# Patient Record
Sex: FEMALE | Race: WHITE | URBAN - METROPOLITAN AREA
[De-identification: names, ages, dates, MRNs, and addresses within clinical notes are randomized per-mention and may not be internally consistent; named-entity substitution may affect disease eponyms.]

---

## 2017-01-20 PROBLEM — D49.2 NEOPLASM OF UNSPECIFIED BEHAVIOR OF BONE, SOFT TISSUE, AND SKIN: Status: RESOLVED | Noted: 2017-01-06 | Resolved: 2017-01-20

## 2018-09-12 ENCOUNTER — APPOINTMENT (RX ONLY)
Dept: URBAN - METROPOLITAN AREA CLINIC 20 | Facility: CLINIC | Age: 53
Setting detail: DERMATOLOGY
End: 2018-09-12

## 2018-09-12 DIAGNOSIS — D18.0 HEMANGIOMA: ICD-10-CM

## 2018-09-12 DIAGNOSIS — L81.4 OTHER MELANIN HYPERPIGMENTATION: ICD-10-CM

## 2018-09-12 DIAGNOSIS — L29.89 OTHER PRURITUS: ICD-10-CM

## 2018-09-12 DIAGNOSIS — L91.8 OTHER HYPERTROPHIC DISORDERS OF THE SKIN: ICD-10-CM

## 2018-09-12 DIAGNOSIS — L57.8 OTHER SKIN CHANGES DUE TO CHRONIC EXPOSURE TO NONIONIZING RADIATION: ICD-10-CM

## 2018-09-12 DIAGNOSIS — D22 MELANOCYTIC NEVI: ICD-10-CM

## 2018-09-12 DIAGNOSIS — L82.0 INFLAMED SEBORRHEIC KERATOSIS: ICD-10-CM

## 2018-09-12 DIAGNOSIS — L29.8 OTHER PRURITUS: ICD-10-CM

## 2018-09-12 DIAGNOSIS — L82.1 OTHER SEBORRHEIC KERATOSIS: ICD-10-CM

## 2018-09-12 PROBLEM — E78.5 HYPERLIPIDEMIA, UNSPECIFIED: Status: ACTIVE | Noted: 2018-09-12

## 2018-09-12 PROBLEM — F32.9 MAJOR DEPRESSIVE DISORDER, SINGLE EPISODE, UNSPECIFIED: Status: ACTIVE | Noted: 2018-09-12

## 2018-09-12 PROBLEM — D18.01 HEMANGIOMA OF SKIN AND SUBCUTANEOUS TISSUE: Status: ACTIVE | Noted: 2018-09-12

## 2018-09-12 PROBLEM — D22.5 MELANOCYTIC NEVI OF TRUNK: Status: ACTIVE | Noted: 2018-09-12

## 2018-09-12 PROBLEM — L57.0 ACTINIC KERATOSIS: Status: ACTIVE | Noted: 2018-09-12

## 2018-09-12 PROBLEM — J45.909 UNSPECIFIED ASTHMA, UNCOMPLICATED: Status: ACTIVE | Noted: 2018-09-12

## 2018-09-12 PROCEDURE — 99214 OFFICE O/P EST MOD 30 MIN: CPT | Mod: 25

## 2018-09-12 PROCEDURE — ? LIQUID NITROGEN

## 2018-09-12 PROCEDURE — 17110 DESTRUCTION B9 LES UP TO 14: CPT

## 2018-09-12 PROCEDURE — ? COUNSELING

## 2018-09-12 PROCEDURE — ? PRESCRIPTION

## 2018-09-12 RX ORDER — TRIAMCINOLONE ACETONIDE 1 MG/G
CREAM TOPICAL BID
Qty: 1 | Refills: 2 | Status: ERX | COMMUNITY
Start: 2018-09-12

## 2018-09-12 RX ADMIN — TRIAMCINOLONE ACETONIDE 1: 1 CREAM TOPICAL at 00:00

## 2018-09-12 ASSESSMENT — LOCATION ZONE DERM
LOCATION ZONE: TRUNK
LOCATION ZONE: FACE
LOCATION ZONE: AXILLAE
LOCATION ZONE: LEG
LOCATION ZONE: ARM

## 2018-09-12 ASSESSMENT — LOCATION SIMPLE DESCRIPTION DERM
LOCATION SIMPLE: LEFT UPPER ARM
LOCATION SIMPLE: RIGHT FOREARM
LOCATION SIMPLE: CHEST
LOCATION SIMPLE: RIGHT UPPER BACK
LOCATION SIMPLE: RIGHT THIGH
LOCATION SIMPLE: LEFT AXILLARY VAULT
LOCATION SIMPLE: RIGHT UPPER ARM
LOCATION SIMPLE: RIGHT AXILLARY VAULT
LOCATION SIMPLE: RIGHT CLAVICULAR SKIN
LOCATION SIMPLE: LEFT FOREARM
LOCATION SIMPLE: LEFT THIGH
LOCATION SIMPLE: LEFT CHEEK
LOCATION SIMPLE: RIGHT CHEEK

## 2018-09-12 ASSESSMENT — LOCATION DETAILED DESCRIPTION DERM
LOCATION DETAILED: LEFT AXILLARY VAULT
LOCATION DETAILED: LEFT LATERAL PROXIMAL UPPER ARM
LOCATION DETAILED: RIGHT INFERIOR UPPER BACK
LOCATION DETAILED: RIGHT CENTRAL MALAR CHEEK
LOCATION DETAILED: RIGHT PROXIMAL DORSAL FOREARM
LOCATION DETAILED: RIGHT ANTERIOR DISTAL THIGH
LOCATION DETAILED: RIGHT SUPERIOR MEDIAL UPPER BACK
LOCATION DETAILED: LEFT ANTERIOR PROXIMAL UPPER ARM
LOCATION DETAILED: RIGHT SUPERIOR CENTRAL MALAR CHEEK
LOCATION DETAILED: RIGHT AXILLARY VAULT
LOCATION DETAILED: LEFT PROXIMAL DORSAL FOREARM
LOCATION DETAILED: RIGHT CLAVICULAR SKIN
LOCATION DETAILED: RIGHT MID-UPPER BACK
LOCATION DETAILED: LEFT ANTERIOR DISTAL THIGH
LOCATION DETAILED: LEFT INFERIOR CENTRAL MALAR CHEEK
LOCATION DETAILED: RIGHT ANTERIOR PROXIMAL UPPER ARM
LOCATION DETAILED: LEFT MEDIAL SUPERIOR CHEST

## 2018-09-12 NOTE — PROCEDURE: LIQUID NITROGEN
[facility] Call Center    Phone Message    May a detailed message be left on voicemail: yes    Reason for Call: Other:     Action Taken: Other: Not routed - encounter not needed - closing it.    
Consent: The patient's consent was obtained including but not limited to risks of crusting, scabbing, blistering, scarring, darker or lighter pigmentary change, recurrence, incomplete removal and infection.
Post-Care Instructions: I reviewed with the patient in detail post-care instructions. Patient is to wear sunprotection, and avoid picking at any of the treated lesions. Pt may apply Vaseline to crusted or scabbing areas.
Include Z78.9 (Other Specified Conditions Influencing Health Status) As An Associated Diagnosis?: No
Medical Necessity Clause: This procedure was medically necessary because the lesions that were treated were:
Detail Level: Detailed
Medical Necessity Information: It is in your best interest to select a reason for this procedure from the list below. All of these items fulfill various CMS LCD requirements except the new and changing color options.
Duration Of Freeze Thaw-Cycle (Seconds): 0

## 2019-07-25 ENCOUNTER — APPOINTMENT (RX ONLY)
Dept: URBAN - METROPOLITAN AREA CLINIC 4 | Facility: CLINIC | Age: 54
Setting detail: DERMATOLOGY
End: 2019-07-25

## 2019-07-25 DIAGNOSIS — L81.4 OTHER MELANIN HYPERPIGMENTATION: ICD-10-CM

## 2019-07-25 DIAGNOSIS — L57.8 OTHER SKIN CHANGES DUE TO CHRONIC EXPOSURE TO NONIONIZING RADIATION: ICD-10-CM

## 2019-07-25 DIAGNOSIS — D22 MELANOCYTIC NEVI: ICD-10-CM

## 2019-07-25 DIAGNOSIS — D18.0 HEMANGIOMA: ICD-10-CM

## 2019-07-25 DIAGNOSIS — L82.1 OTHER SEBORRHEIC KERATOSIS: ICD-10-CM

## 2019-07-25 DIAGNOSIS — I78.1 NEVUS, NON-NEOPLASTIC: ICD-10-CM

## 2019-07-25 DIAGNOSIS — Z71.89 OTHER SPECIFIED COUNSELING: ICD-10-CM

## 2019-07-25 PROBLEM — D18.01 HEMANGIOMA OF SKIN AND SUBCUTANEOUS TISSUE: Status: ACTIVE | Noted: 2019-07-25

## 2019-07-25 PROBLEM — D22.5 MELANOCYTIC NEVI OF TRUNK: Status: ACTIVE | Noted: 2019-07-25

## 2019-07-25 PROCEDURE — 99214 OFFICE O/P EST MOD 30 MIN: CPT

## 2019-07-25 PROCEDURE — ? COUNSELING

## 2019-07-25 PROCEDURE — ? ADDITIONAL NOTES

## 2019-07-25 ASSESSMENT — LOCATION ZONE DERM
LOCATION ZONE: ARM
LOCATION ZONE: TRUNK
LOCATION ZONE: FACE

## 2019-07-25 ASSESSMENT — LOCATION DETAILED DESCRIPTION DERM
LOCATION DETAILED: LEFT DISTAL DORSAL FOREARM
LOCATION DETAILED: RIGHT CENTRAL MALAR CHEEK
LOCATION DETAILED: UPPER STERNUM
LOCATION DETAILED: RIGHT SUPERIOR MEDIAL MIDBACK
LOCATION DETAILED: LEFT PROXIMAL DORSAL FOREARM
LOCATION DETAILED: RIGHT INFERIOR CENTRAL MALAR CHEEK
LOCATION DETAILED: RIGHT PROXIMAL DORSAL FOREARM
LOCATION DETAILED: STERNAL NOTCH
LOCATION DETAILED: INFERIOR THORACIC SPINE
LOCATION DETAILED: LEFT INFERIOR CENTRAL MALAR CHEEK
LOCATION DETAILED: LEFT INFERIOR MEDIAL MIDBACK
LOCATION DETAILED: RIGHT DISTAL DORSAL FOREARM

## 2019-07-25 ASSESSMENT — LOCATION SIMPLE DESCRIPTION DERM
LOCATION SIMPLE: RIGHT LOWER BACK
LOCATION SIMPLE: UPPER BACK
LOCATION SIMPLE: CHEST
LOCATION SIMPLE: RIGHT FOREARM
LOCATION SIMPLE: LEFT LOWER BACK
LOCATION SIMPLE: RIGHT CHEEK
LOCATION SIMPLE: LEFT FOREARM
LOCATION SIMPLE: LEFT CHEEK

## 2019-07-25 NOTE — PROCEDURE: ADDITIONAL NOTES
Detail Level: Simple
Additional Notes: Reassure and observe for any changes. \\nIncludes spots of concern on intake.

## 2020-03-05 ENCOUNTER — HOSPITAL ENCOUNTER (OUTPATIENT)
Dept: LAB | Facility: MEDICAL CENTER | Age: 55
End: 2020-03-05
Attending: NURSE PRACTITIONER
Payer: COMMERCIAL

## 2020-03-05 LAB
ALBUMIN SERPL BCP-MCNC: 4.3 G/DL (ref 3.2–4.9)
ALBUMIN/GLOB SERPL: 1.7 G/DL
ALP SERPL-CCNC: 92 U/L (ref 30–99)
ALT SERPL-CCNC: 43 U/L (ref 2–50)
ANION GAP SERPL CALC-SCNC: 10 MMOL/L (ref 0–11.9)
AST SERPL-CCNC: 31 U/L (ref 12–45)
BASOPHILS # BLD AUTO: 0.7 % (ref 0–1.8)
BASOPHILS # BLD: 0.05 K/UL (ref 0–0.12)
BILIRUB SERPL-MCNC: 0.7 MG/DL (ref 0.1–1.5)
BUN SERPL-MCNC: 22 MG/DL (ref 8–22)
CALCIUM SERPL-MCNC: 9.1 MG/DL (ref 8.5–10.5)
CHLORIDE SERPL-SCNC: 106 MMOL/L (ref 96–112)
CHOLEST SERPL-MCNC: 214 MG/DL (ref 100–199)
CO2 SERPL-SCNC: 24 MMOL/L (ref 20–33)
CREAT SERPL-MCNC: 0.73 MG/DL (ref 0.5–1.4)
EOSINOPHIL # BLD AUTO: 0.36 K/UL (ref 0–0.51)
EOSINOPHIL NFR BLD: 4.7 % (ref 0–6.9)
ERYTHROCYTE [DISTWIDTH] IN BLOOD BY AUTOMATED COUNT: 47.4 FL (ref 35.9–50)
EST. AVERAGE GLUCOSE BLD GHB EST-MCNC: 108 MG/DL
FASTING STATUS PATIENT QL REPORTED: NORMAL
GLOBULIN SER CALC-MCNC: 2.6 G/DL (ref 1.9–3.5)
GLUCOSE SERPL-MCNC: 88 MG/DL (ref 65–99)
HBA1C MFR BLD: 5.4 % (ref 0–5.6)
HCT VFR BLD AUTO: 35.5 % (ref 37–47)
HDLC SERPL-MCNC: 103 MG/DL
HGB BLD-MCNC: 11.7 G/DL (ref 12–16)
IMM GRANULOCYTES # BLD AUTO: 0.04 K/UL (ref 0–0.11)
IMM GRANULOCYTES NFR BLD AUTO: 0.5 % (ref 0–0.9)
LDLC SERPL CALC-MCNC: 94 MG/DL
LYMPHOCYTES # BLD AUTO: 1.38 K/UL (ref 1–4.8)
LYMPHOCYTES NFR BLD: 18.2 % (ref 22–41)
MCH RBC QN AUTO: 32.1 PG (ref 27–33)
MCHC RBC AUTO-ENTMCNC: 33 G/DL (ref 33.6–35)
MCV RBC AUTO: 97.5 FL (ref 81.4–97.8)
MONOCYTES # BLD AUTO: 0.64 K/UL (ref 0–0.85)
MONOCYTES NFR BLD AUTO: 8.4 % (ref 0–13.4)
NEUTROPHILS # BLD AUTO: 5.12 K/UL (ref 2–7.15)
NEUTROPHILS NFR BLD: 67.5 % (ref 44–72)
NRBC # BLD AUTO: 0 K/UL
NRBC BLD-RTO: 0 /100 WBC
PLATELET # BLD AUTO: 339 K/UL (ref 164–446)
PMV BLD AUTO: 9.7 FL (ref 9–12.9)
POTASSIUM SERPL-SCNC: 3.9 MMOL/L (ref 3.6–5.5)
PROT SERPL-MCNC: 6.9 G/DL (ref 6–8.2)
RBC # BLD AUTO: 3.64 M/UL (ref 4.2–5.4)
SODIUM SERPL-SCNC: 140 MMOL/L (ref 135–145)
TRIGL SERPL-MCNC: 84 MG/DL (ref 0–149)
WBC # BLD AUTO: 7.6 K/UL (ref 4.8–10.8)

## 2020-03-05 PROCEDURE — 85025 COMPLETE CBC W/AUTO DIFF WBC: CPT

## 2020-03-05 PROCEDURE — 83036 HEMOGLOBIN GLYCOSYLATED A1C: CPT

## 2020-03-05 PROCEDURE — 80053 COMPREHEN METABOLIC PANEL: CPT

## 2020-03-05 PROCEDURE — 36415 COLL VENOUS BLD VENIPUNCTURE: CPT

## 2020-03-05 PROCEDURE — 80061 LIPID PANEL: CPT

## 2020-03-12 ENCOUNTER — HOSPITAL ENCOUNTER (OUTPATIENT)
Facility: MEDICAL CENTER | Age: 55
End: 2020-03-12
Attending: NURSE PRACTITIONER
Payer: COMMERCIAL

## 2020-03-12 LAB — HEMOCCULT STL QL: NEGATIVE

## 2020-03-12 PROCEDURE — 82272 OCCULT BLD FECES 1-3 TESTS: CPT

## 2020-04-10 ENCOUNTER — HOSPITAL ENCOUNTER (OUTPATIENT)
Dept: LAB | Facility: MEDICAL CENTER | Age: 55
End: 2020-04-10
Attending: NURSE PRACTITIONER
Payer: COMMERCIAL

## 2020-04-10 LAB
BASOPHILS # BLD AUTO: 0.9 % (ref 0–1.8)
BASOPHILS # BLD: 0.07 K/UL (ref 0–0.12)
EOSINOPHIL # BLD AUTO: 0.35 K/UL (ref 0–0.51)
EOSINOPHIL NFR BLD: 4.7 % (ref 0–6.9)
ERYTHROCYTE [DISTWIDTH] IN BLOOD BY AUTOMATED COUNT: 44.7 FL (ref 35.9–50)
FERRITIN SERPL-MCNC: 119 NG/ML (ref 10–291)
HCT VFR BLD AUTO: 38.2 % (ref 37–47)
HGB BLD-MCNC: 12.8 G/DL (ref 12–16)
IMM GRANULOCYTES # BLD AUTO: 0.02 K/UL (ref 0–0.11)
IMM GRANULOCYTES NFR BLD AUTO: 0.3 % (ref 0–0.9)
IRON SATN MFR SERPL: 25 % (ref 15–55)
IRON SERPL-MCNC: 82 UG/DL (ref 40–170)
LYMPHOCYTES # BLD AUTO: 1.3 K/UL (ref 1–4.8)
LYMPHOCYTES NFR BLD: 17.6 % (ref 22–41)
MCH RBC QN AUTO: 31.8 PG (ref 27–33)
MCHC RBC AUTO-ENTMCNC: 33.5 G/DL (ref 33.6–35)
MCV RBC AUTO: 94.8 FL (ref 81.4–97.8)
MONOCYTES # BLD AUTO: 0.56 K/UL (ref 0–0.85)
MONOCYTES NFR BLD AUTO: 7.6 % (ref 0–13.4)
NEUTROPHILS # BLD AUTO: 5.08 K/UL (ref 2–7.15)
NEUTROPHILS NFR BLD: 68.9 % (ref 44–72)
NRBC # BLD AUTO: 0 K/UL
NRBC BLD-RTO: 0 /100 WBC
PLATELET # BLD AUTO: 324 K/UL (ref 164–446)
PMV BLD AUTO: 9.9 FL (ref 9–12.9)
RBC # BLD AUTO: 4.03 M/UL (ref 4.2–5.4)
TIBC SERPL-MCNC: 322 UG/DL (ref 250–450)
UIBC SERPL-MCNC: 240 UG/DL (ref 110–370)
WBC # BLD AUTO: 7.4 K/UL (ref 4.8–10.8)

## 2020-04-10 PROCEDURE — 36415 COLL VENOUS BLD VENIPUNCTURE: CPT

## 2020-04-10 PROCEDURE — 82728 ASSAY OF FERRITIN: CPT

## 2020-04-10 PROCEDURE — 83540 ASSAY OF IRON: CPT

## 2020-04-10 PROCEDURE — 85025 COMPLETE CBC W/AUTO DIFF WBC: CPT

## 2020-04-10 PROCEDURE — 83550 IRON BINDING TEST: CPT

## 2020-06-17 ENCOUNTER — APPOINTMENT (RX ONLY)
Dept: URBAN - METROPOLITAN AREA CLINIC 4 | Facility: CLINIC | Age: 55
Setting detail: DERMATOLOGY
End: 2020-06-17

## 2020-06-17 DIAGNOSIS — L82.1 OTHER SEBORRHEIC KERATOSIS: ICD-10-CM

## 2020-06-17 DIAGNOSIS — B07.0 PLANTAR WART: ICD-10-CM

## 2020-06-17 DIAGNOSIS — L81.4 OTHER MELANIN HYPERPIGMENTATION: ICD-10-CM

## 2020-06-17 DIAGNOSIS — D22 MELANOCYTIC NEVI: ICD-10-CM

## 2020-06-17 DIAGNOSIS — D18.0 HEMANGIOMA: ICD-10-CM

## 2020-06-17 DIAGNOSIS — Z71.89 OTHER SPECIFIED COUNSELING: ICD-10-CM

## 2020-06-17 DIAGNOSIS — L57.8 OTHER SKIN CHANGES DUE TO CHRONIC EXPOSURE TO NONIONIZING RADIATION: ICD-10-CM

## 2020-06-17 PROBLEM — D18.01 HEMANGIOMA OF SKIN AND SUBCUTANEOUS TISSUE: Status: ACTIVE | Noted: 2020-06-17

## 2020-06-17 PROBLEM — D48.5 NEOPLASM OF UNCERTAIN BEHAVIOR OF SKIN: Status: ACTIVE | Noted: 2020-06-17

## 2020-06-17 PROBLEM — D22.5 MELANOCYTIC NEVI OF TRUNK: Status: ACTIVE | Noted: 2020-06-17

## 2020-06-17 PROCEDURE — ? LIQUID NITROGEN

## 2020-06-17 PROCEDURE — ? COUNSELING

## 2020-06-17 PROCEDURE — ? ADDITIONAL NOTES

## 2020-06-17 PROCEDURE — 99213 OFFICE O/P EST LOW 20 MIN: CPT | Mod: 25

## 2020-06-17 PROCEDURE — 17110 DESTRUCTION B9 LES UP TO 14: CPT

## 2020-06-17 ASSESSMENT — LOCATION DETAILED DESCRIPTION DERM
LOCATION DETAILED: RIGHT DISTAL DORSAL FOREARM
LOCATION DETAILED: LEFT DISTAL DORSAL FOREARM
LOCATION DETAILED: RIGHT INFERIOR CENTRAL MALAR CHEEK
LOCATION DETAILED: RIGHT SUPERIOR MEDIAL MIDBACK
LOCATION DETAILED: LEFT INFERIOR CENTRAL MALAR CHEEK
LOCATION DETAILED: LEFT PROXIMAL DORSAL FOREARM
LOCATION DETAILED: LEFT INFERIOR MEDIAL MIDBACK
LOCATION DETAILED: RIGHT PROXIMAL DORSAL FOREARM
LOCATION DETAILED: UPPER STERNUM
LOCATION DETAILED: INFERIOR THORACIC SPINE
LOCATION DETAILED: LEFT INSTEP
LOCATION DETAILED: STERNAL NOTCH

## 2020-06-17 ASSESSMENT — LOCATION ZONE DERM
LOCATION ZONE: FEET
LOCATION ZONE: FACE
LOCATION ZONE: ARM
LOCATION ZONE: TRUNK

## 2020-06-17 ASSESSMENT — LOCATION SIMPLE DESCRIPTION DERM
LOCATION SIMPLE: LEFT LOWER BACK
LOCATION SIMPLE: RIGHT CHEEK
LOCATION SIMPLE: RIGHT FOREARM
LOCATION SIMPLE: CHEST
LOCATION SIMPLE: LEFT CHEEK
LOCATION SIMPLE: UPPER BACK
LOCATION SIMPLE: LEFT PLANTAR SURFACE
LOCATION SIMPLE: LEFT FOREARM
LOCATION SIMPLE: RIGHT LOWER BACK

## 2020-06-18 ENCOUNTER — HOSPITAL ENCOUNTER (OUTPATIENT)
Dept: LAB | Facility: MEDICAL CENTER | Age: 55
End: 2020-06-18
Attending: NURSE PRACTITIONER
Payer: COMMERCIAL

## 2020-06-18 LAB
BASOPHILS # BLD AUTO: 1 % (ref 0–1.8)
BASOPHILS # BLD: 0.07 K/UL (ref 0–0.12)
EOSINOPHIL # BLD AUTO: 0.5 K/UL (ref 0–0.51)
EOSINOPHIL NFR BLD: 7.3 % (ref 0–6.9)
ERYTHROCYTE [DISTWIDTH] IN BLOOD BY AUTOMATED COUNT: 47.7 FL (ref 35.9–50)
HCT VFR BLD AUTO: 39.8 % (ref 37–47)
HGB BLD-MCNC: 13 G/DL (ref 12–16)
IMM GRANULOCYTES # BLD AUTO: 0.02 K/UL (ref 0–0.11)
IMM GRANULOCYTES NFR BLD AUTO: 0.3 % (ref 0–0.9)
LYMPHOCYTES # BLD AUTO: 1.27 K/UL (ref 1–4.8)
LYMPHOCYTES NFR BLD: 18.5 % (ref 22–41)
MCH RBC QN AUTO: 31.9 PG (ref 27–33)
MCHC RBC AUTO-ENTMCNC: 32.7 G/DL (ref 33.6–35)
MCV RBC AUTO: 97.8 FL (ref 81.4–97.8)
MONOCYTES # BLD AUTO: 0.59 K/UL (ref 0–0.85)
MONOCYTES NFR BLD AUTO: 8.6 % (ref 0–13.4)
NEUTROPHILS # BLD AUTO: 4.4 K/UL (ref 2–7.15)
NEUTROPHILS NFR BLD: 64.3 % (ref 44–72)
NRBC # BLD AUTO: 0 K/UL
NRBC BLD-RTO: 0 /100 WBC
PLATELET # BLD AUTO: 309 K/UL (ref 164–446)
PMV BLD AUTO: 10.1 FL (ref 9–12.9)
RBC # BLD AUTO: 4.07 M/UL (ref 4.2–5.4)
WBC # BLD AUTO: 6.9 K/UL (ref 4.8–10.8)

## 2020-06-18 PROCEDURE — 80053 COMPREHEN METABOLIC PANEL: CPT

## 2020-06-18 PROCEDURE — 83550 IRON BINDING TEST: CPT

## 2020-06-18 PROCEDURE — 36415 COLL VENOUS BLD VENIPUNCTURE: CPT

## 2020-06-18 PROCEDURE — 83036 HEMOGLOBIN GLYCOSYLATED A1C: CPT

## 2020-06-18 PROCEDURE — 80061 LIPID PANEL: CPT

## 2020-06-18 PROCEDURE — 85025 COMPLETE CBC W/AUTO DIFF WBC: CPT

## 2020-06-18 PROCEDURE — 83540 ASSAY OF IRON: CPT

## 2020-06-19 LAB
ALBUMIN SERPL BCP-MCNC: 4.8 G/DL (ref 3.2–4.9)
ALBUMIN/GLOB SERPL: 1.9 G/DL
ALP SERPL-CCNC: 104 U/L (ref 30–99)
ALT SERPL-CCNC: 50 U/L (ref 2–50)
ANION GAP SERPL CALC-SCNC: 15 MMOL/L (ref 7–16)
AST SERPL-CCNC: 37 U/L (ref 12–45)
BILIRUB SERPL-MCNC: 0.5 MG/DL (ref 0.1–1.5)
BUN SERPL-MCNC: 15 MG/DL (ref 8–22)
CALCIUM SERPL-MCNC: 9.6 MG/DL (ref 8.5–10.5)
CHLORIDE SERPL-SCNC: 99 MMOL/L (ref 96–112)
CHOLEST SERPL-MCNC: 316 MG/DL (ref 100–199)
CO2 SERPL-SCNC: 24 MMOL/L (ref 20–33)
CREAT SERPL-MCNC: 0.75 MG/DL (ref 0.5–1.4)
EST. AVERAGE GLUCOSE BLD GHB EST-MCNC: 105 MG/DL
FASTING STATUS PATIENT QL REPORTED: NORMAL
GLOBULIN SER CALC-MCNC: 2.5 G/DL (ref 1.9–3.5)
GLUCOSE SERPL-MCNC: 81 MG/DL (ref 65–99)
HBA1C MFR BLD: 5.3 % (ref 0–5.6)
HDLC SERPL-MCNC: 115 MG/DL
IRON SATN MFR SERPL: 28 % (ref 15–55)
IRON SERPL-MCNC: 93 UG/DL (ref 40–170)
LDLC SERPL CALC-MCNC: 172 MG/DL
POTASSIUM SERPL-SCNC: 4.6 MMOL/L (ref 3.6–5.5)
PROT SERPL-MCNC: 7.3 G/DL (ref 6–8.2)
SODIUM SERPL-SCNC: 138 MMOL/L (ref 135–145)
TIBC SERPL-MCNC: 327 UG/DL (ref 250–450)
TRIGL SERPL-MCNC: 145 MG/DL (ref 0–149)
UIBC SERPL-MCNC: 234 UG/DL (ref 110–370)

## 2020-06-22 ENCOUNTER — HOSPITAL ENCOUNTER (EMERGENCY)
Facility: MEDICAL CENTER | Age: 55
End: 2020-06-22
Attending: EMERGENCY MEDICINE
Payer: COMMERCIAL

## 2020-06-22 ENCOUNTER — APPOINTMENT (OUTPATIENT)
Dept: RADIOLOGY | Facility: MEDICAL CENTER | Age: 55
End: 2020-06-22
Attending: EMERGENCY MEDICINE
Payer: COMMERCIAL

## 2020-06-22 VITALS
WEIGHT: 149.91 LBS | HEIGHT: 63 IN | HEART RATE: 87 BPM | TEMPERATURE: 98.6 F | SYSTOLIC BLOOD PRESSURE: 132 MMHG | DIASTOLIC BLOOD PRESSURE: 61 MMHG | OXYGEN SATURATION: 96 % | BODY MASS INDEX: 26.56 KG/M2 | RESPIRATION RATE: 18 BRPM

## 2020-06-22 DIAGNOSIS — K52.9 COLITIS: ICD-10-CM

## 2020-06-22 LAB
ABO + RH BLD: NORMAL
ABO GROUP BLD: NORMAL
ALBUMIN SERPL BCP-MCNC: 4.3 G/DL (ref 3.2–4.9)
ALBUMIN/GLOB SERPL: 1.6 G/DL
ALP SERPL-CCNC: 104 U/L (ref 30–99)
ALT SERPL-CCNC: 44 U/L (ref 2–50)
ANION GAP SERPL CALC-SCNC: 14 MMOL/L (ref 7–16)
APTT PPP: 25.8 SEC (ref 24.7–36)
AST SERPL-CCNC: 33 U/L (ref 12–45)
BASOPHILS # BLD AUTO: 0.3 % (ref 0–1.8)
BASOPHILS # BLD: 0.05 K/UL (ref 0–0.12)
BILIRUB SERPL-MCNC: 1 MG/DL (ref 0.1–1.5)
BLD GP AB SCN SERPL QL: NORMAL
BUN SERPL-MCNC: 10 MG/DL (ref 8–22)
CALCIUM SERPL-MCNC: 9.1 MG/DL (ref 8.4–10.2)
CHLORIDE SERPL-SCNC: 95 MMOL/L (ref 96–112)
CO2 SERPL-SCNC: 24 MMOL/L (ref 20–33)
CREAT SERPL-MCNC: 0.73 MG/DL (ref 0.5–1.4)
EOSINOPHIL # BLD AUTO: 0.19 K/UL (ref 0–0.51)
EOSINOPHIL NFR BLD: 1.2 % (ref 0–6.9)
ERYTHROCYTE [DISTWIDTH] IN BLOOD BY AUTOMATED COUNT: 45.3 FL (ref 35.9–50)
GLOBULIN SER CALC-MCNC: 2.7 G/DL (ref 1.9–3.5)
GLUCOSE SERPL-MCNC: 108 MG/DL (ref 65–99)
HCT VFR BLD AUTO: 36.3 % (ref 37–47)
HGB BLD-MCNC: 12.2 G/DL (ref 12–16)
IMM GRANULOCYTES # BLD AUTO: 0.07 K/UL (ref 0–0.11)
IMM GRANULOCYTES NFR BLD AUTO: 0.5 % (ref 0–0.9)
INR PPP: 0.91 (ref 0.87–1.13)
LIPASE SERPL-CCNC: 29 U/L (ref 7–58)
LYMPHOCYTES # BLD AUTO: 1.56 K/UL (ref 1–4.8)
LYMPHOCYTES NFR BLD: 10.2 % (ref 22–41)
MCH RBC QN AUTO: 31.4 PG (ref 27–33)
MCHC RBC AUTO-ENTMCNC: 33.6 G/DL (ref 33.6–35)
MCV RBC AUTO: 93.3 FL (ref 81.4–97.8)
MONOCYTES # BLD AUTO: 0.81 K/UL (ref 0–0.85)
MONOCYTES NFR BLD AUTO: 5.3 % (ref 0–13.4)
NEUTROPHILS # BLD AUTO: 12.68 K/UL (ref 2–7.15)
NEUTROPHILS NFR BLD: 82.5 % (ref 44–72)
NRBC # BLD AUTO: 0 K/UL
NRBC BLD-RTO: 0 /100 WBC
PLATELET # BLD AUTO: 262 K/UL (ref 164–446)
PMV BLD AUTO: 9.2 FL (ref 9–12.9)
POTASSIUM SERPL-SCNC: 3.7 MMOL/L (ref 3.6–5.5)
PROT SERPL-MCNC: 7 G/DL (ref 6–8.2)
PROTHROMBIN TIME: 12.3 SEC (ref 12–14.6)
RBC # BLD AUTO: 3.89 M/UL (ref 4.2–5.4)
RH BLD: NORMAL
SODIUM SERPL-SCNC: 133 MMOL/L (ref 135–145)
WBC # BLD AUTO: 15.4 K/UL (ref 4.8–10.8)

## 2020-06-22 PROCEDURE — 71045 X-RAY EXAM CHEST 1 VIEW: CPT

## 2020-06-22 PROCEDURE — 85730 THROMBOPLASTIN TIME PARTIAL: CPT

## 2020-06-22 PROCEDURE — 700102 HCHG RX REV CODE 250 W/ 637 OVERRIDE(OP): Performed by: EMERGENCY MEDICINE

## 2020-06-22 PROCEDURE — 86900 BLOOD TYPING SEROLOGIC ABO: CPT

## 2020-06-22 PROCEDURE — 83690 ASSAY OF LIPASE: CPT

## 2020-06-22 PROCEDURE — 85025 COMPLETE CBC W/AUTO DIFF WBC: CPT

## 2020-06-22 PROCEDURE — A9270 NON-COVERED ITEM OR SERVICE: HCPCS | Performed by: EMERGENCY MEDICINE

## 2020-06-22 PROCEDURE — 86850 RBC ANTIBODY SCREEN: CPT

## 2020-06-22 PROCEDURE — 80053 COMPREHEN METABOLIC PANEL: CPT

## 2020-06-22 PROCEDURE — 85610 PROTHROMBIN TIME: CPT

## 2020-06-22 PROCEDURE — 99284 EMERGENCY DEPT VISIT MOD MDM: CPT

## 2020-06-22 PROCEDURE — 36415 COLL VENOUS BLD VENIPUNCTURE: CPT

## 2020-06-22 PROCEDURE — 86901 BLOOD TYPING SEROLOGIC RH(D): CPT

## 2020-06-22 RX ORDER — METRONIDAZOLE 500 MG/1
500 TABLET ORAL ONCE
Status: COMPLETED | OUTPATIENT
Start: 2020-06-22 | End: 2020-06-22

## 2020-06-22 RX ORDER — CIPROFLOXACIN 500 MG/1
500 TABLET, FILM COATED ORAL ONCE
Status: COMPLETED | OUTPATIENT
Start: 2020-06-22 | End: 2020-06-22

## 2020-06-22 RX ORDER — METRONIDAZOLE 500 MG/1
500 TABLET ORAL 3 TIMES DAILY
Qty: 21 TAB | Refills: 0 | Status: SHIPPED | OUTPATIENT
Start: 2020-06-22 | End: 2020-06-29

## 2020-06-22 RX ORDER — CIPROFLOXACIN 500 MG/1
500 TABLET, FILM COATED ORAL 2 TIMES DAILY
Qty: 14 TAB | Refills: 0 | Status: SHIPPED | OUTPATIENT
Start: 2020-06-22 | End: 2020-06-29

## 2020-06-22 RX ADMIN — CIPROFLOXACIN HYDROCHLORIDE 500 MG: 500 TABLET, FILM COATED ORAL at 18:54

## 2020-06-22 RX ADMIN — METRONIDAZOLE 500 MG: 500 TABLET ORAL at 18:54

## 2020-06-22 SDOH — HEALTH STABILITY: MENTAL HEALTH: HOW OFTEN DO YOU HAVE A DRINK CONTAINING ALCOHOL?: 2-3 TIMES A WEEK

## 2020-06-22 ASSESSMENT — FIBROSIS 4 INDEX: FIB4 SCORE: 0.95

## 2020-06-22 ASSESSMENT — LIFESTYLE VARIABLES: DO YOU DRINK ALCOHOL: NO

## 2020-06-23 NOTE — ED TRIAGE NOTES
"Merari Paulson 54 y.o. female   Chief Complaint   Patient presents with   • Sent by MD     Had CT scan today that showed \"colitis\" and told to come to the ER for treatment   • Abdominal Pain     Abdominal pain and bright red blood in diarrhea since Sunday   • Bloody Stools     /71   Pulse 98   Temp 37 °C (98.6 °F) (Temporal)   Resp 18   Ht 1.6 m (5' 3\")   Wt 68 kg (149 lb 14.6 oz)   SpO2 95%   BMI 26.56 kg/m²     Pt returned to lob and educated on triage process. Advised to notify RN with changes or concerns.   Negative COVID screen.  "

## 2020-06-23 NOTE — ED NOTES
Pt back to room    PIV obtained, labs and urine sample taken to lab    Pt resting on gurney, pt in no acute distress, pt provided call light, instructed to call if needing any assistance, instructed not to get up by self, brandon in lowest position.

## 2020-06-23 NOTE — ED PROVIDER NOTES
"ED Provider Note    ED Provider Note    Primary care provider: NATASHA Nunez  Means of arrival: Walk-in  History obtained from: Patient    CHIEF COMPLAINT  Chief Complaint   Patient presents with   • Sent by MD     Had CT scan today that showed \"colitis\" and told to come to the ER for treatment   • Abdominal Pain     Abdominal pain and bright red blood in diarrhea since Sunday   • Bloody Stools     Seen at 5:56 PM.   HPI  Merari Paulson is a 54 y.o. female who presents to the Emergency Department for abdominal pain.  The patient notes on Saturday (48-72 hours ago) she began having bright red watery non-formed bowel movements.  This diarrhea persisted for the next 2 days but gradually improved.  She did not have any diarrhea today (Monday).  She had waxing and waning severe cramping left-sided abdominal pain with stabbing symptoms.  These were without any modifying factors.  She feels the pain today is improved compared to how it was yesterday evening.  She went to the [facility] and they ordered a CT.  The CT apparently shows colitis and she was sent to this emergency department for evaluation.    She has had a colonoscopy in the past, she denies any prior history of diverticulitis.  She denies any recent fevers, chills, nausea, vomiting, chest pain, shortness of breath, dysuria, rash or impaired immunity.  She does not take any medications, no history of bleeding diathesis.  She denies feeling lightheaded.  She notes some mild decrease in appetite.    REVIEW OF SYSTEMS  See HPI,   Remainder of ROS negative.     PAST MEDICAL HISTORY   Denies    SURGICAL HISTORY  patient denies any surgical history    SOCIAL HISTORY  Social History     Tobacco Use   • Smoking status: Former Smoker   • Smokeless tobacco: Never Used   Substance Use Topics   • Alcohol use: Yes     Frequency: 2-3 times a week   • Drug use: Never      Social History     Substance and Sexual Activity   Drug Use Never       FAMILY " "HISTORY  History reviewed. No pertinent family history.    CURRENT MEDICATIONS  Reviewed.  See Encounter Summary.     ALLERGIES  Allergies   Allergen Reactions   • Bactrim [Sulfamethoxazole W-Trimethoprim] Hives   • Pcn [Penicillins] Unspecified     \"I lose my coordination and act like I'm drunk\"       PHYSICAL EXAM  VITAL SIGNS: /61   Pulse 87   Temp 37 °C (98.6 °F) (Temporal)   Resp 18   Ht 1.6 m (5' 3\")   Wt 68 kg (149 lb 14.6 oz)   SpO2 96%   BMI 26.56 kg/m²   Constitutional: Awake, alert in no apparent distress.  HENT: Normocephalic, Bilateral external ears normal. Nose normal.   Eyes: Conjunctiva normal, non-icteric, EOMI.    Thorax & Lungs: Easy unlabored respirations, Clear to ascultation bilaterally.  Cardiovascular: Regular rate, Regular rhythm, No murmurs, rubs or gallops.  Abdomen:  Soft, mild left-sided abdominal tenderness without rebound or guarding, nondistended, normal active bowel sounds.   :    Skin: Visualized skin is  Dry, No erythema, No rash.   Musculoskeletal:   No cyanosis, clubbing or edema.  Neurologic: Alert, Grossly non-focal.   Psychiatric: Normal affect, Normal mood  Lymphatic:  No cervical LAD        RADIOLOGY  DX-CHEST-PORTABLE (1 VIEW)   Final Result      1.  No acute cardiopulmonary disease.   2.  Small convex density at the medial LEFT lung base, nodule versus hiatal hernia.  Follow-up recommended.            COURSE & MEDICAL DECISION MAKING  Pertinent Labs & Imaging studies reviewed. (See chart for details)    Differential diagnoses include but are not limited to: Hemorrhagic colitis most likely    5:56 PM - Medical record reviewed, patient seen and examined at bedside.    For the medical coders: The coags, type and screen were ordered at triage.  I personally did not order these lab tests.    6:37 PM -I called the radiology department, CT shows a hiatal hernia hiatal hernia. 30mm L kidney cyst (no need for repeat radiological studies according to radiologist). " Aorta NL. Circumferential mural thickening of the descending colon, this can be secondary to ischemia, infection or idiopathic.      Decision Making:  This is a 54 y.o. year old female who presents with left-sided abdominal pain that is gradually improving along with associated hemorrhagic diarrhea.  The diarrhea itself is resolving as well.  CT showed some left-sided colonic thickening.  The patient does have a leukocytosis.  I feel this is most likely infectious colitis.  Her pain is minimal today despite 48 to 72 hours of symptoms.  I do not feel that ischemic colitis is likely as she should appear much more ill at this point.  She also has no risk factors for this, (no history of atrial fibrillation, no hypotension).    I will treat the patient with Cipro and Flagyl.  The choice for ciprofloxacin is because the patient has a documented allergy to penicillins.  She should return for any severe abdominal pain or any other concern.    Discharge Medications:  Discharge Medication List as of 6/22/2020  6:56 PM      START taking these medications    Details   ciprofloxacin (CIPRO) 500 MG Tab Take 1 Tab by mouth 2 times a day for 7 days., Disp-14 Tab,R-0, Print Rx Paper      metroNIDAZOLE (FLAGYL) 500 MG Tab Take 1 Tab by mouth 3 times a day for 7 days., Disp-21 Tab,R-0, Print Rx Paper             The patient was discharged home (see d/c instructions) was told to return immediately for any signs or symptoms listed, or any worsening at all.  The patient verbally agreed to the discharge precautions and follow-up plan which is documented in EPIC.    The patient's blood pressure is elevated today. >120/80. I have referred them to primary care for follow up.       FINAL IMPRESSION  1. Colitis

## 2020-06-23 NOTE — ED NOTES
Pt discharged, reviewed all discharge instructions including follow up and prescriptions, pt verbalizes understanding, and denies questions.   Escorted to lobby. No belongings left in room.

## 2020-09-30 ENCOUNTER — HOSPITAL ENCOUNTER (OUTPATIENT)
Dept: LAB | Facility: MEDICAL CENTER | Age: 55
End: 2020-09-30
Attending: NURSE PRACTITIONER
Payer: COMMERCIAL

## 2020-09-30 LAB
ALBUMIN SERPL BCP-MCNC: 4.5 G/DL (ref 3.2–4.9)
ALBUMIN/GLOB SERPL: 1.7 G/DL
ALP SERPL-CCNC: 103 U/L (ref 30–99)
ALT SERPL-CCNC: 31 U/L (ref 2–50)
ANION GAP SERPL CALC-SCNC: 13 MMOL/L (ref 7–16)
AST SERPL-CCNC: 23 U/L (ref 12–45)
BASOPHILS # BLD AUTO: 0.5 % (ref 0–1.8)
BASOPHILS # BLD: 0.04 K/UL (ref 0–0.12)
BILIRUB SERPL-MCNC: 0.5 MG/DL (ref 0.1–1.5)
BUN SERPL-MCNC: 16 MG/DL (ref 8–22)
CALCIUM SERPL-MCNC: 9.4 MG/DL (ref 8.5–10.5)
CHLORIDE SERPL-SCNC: 100 MMOL/L (ref 96–112)
CHOLEST SERPL-MCNC: 285 MG/DL (ref 100–199)
CO2 SERPL-SCNC: 25 MMOL/L (ref 20–33)
CREAT SERPL-MCNC: 0.7 MG/DL (ref 0.5–1.4)
EOSINOPHIL # BLD AUTO: 0.39 K/UL (ref 0–0.51)
EOSINOPHIL NFR BLD: 5.2 % (ref 0–6.9)
ERYTHROCYTE [DISTWIDTH] IN BLOOD BY AUTOMATED COUNT: 46.3 FL (ref 35.9–50)
EST. AVERAGE GLUCOSE BLD GHB EST-MCNC: 108 MG/DL
FASTING STATUS PATIENT QL REPORTED: NORMAL
GLOBULIN SER CALC-MCNC: 2.7 G/DL (ref 1.9–3.5)
GLUCOSE SERPL-MCNC: 96 MG/DL (ref 65–99)
HBA1C MFR BLD: 5.4 % (ref 0–5.6)
HCT VFR BLD AUTO: 39 % (ref 37–47)
HDLC SERPL-MCNC: 103 MG/DL
HGB BLD-MCNC: 12.6 G/DL (ref 12–16)
IMM GRANULOCYTES # BLD AUTO: 0.02 K/UL (ref 0–0.11)
IMM GRANULOCYTES NFR BLD AUTO: 0.3 % (ref 0–0.9)
IRON SATN MFR SERPL: 37 % (ref 15–55)
IRON SERPL-MCNC: 112 UG/DL (ref 40–170)
LDLC SERPL CALC-MCNC: 162 MG/DL
LYMPHOCYTES # BLD AUTO: 1.78 K/UL (ref 1–4.8)
LYMPHOCYTES NFR BLD: 23.8 % (ref 22–41)
MCH RBC QN AUTO: 31.6 PG (ref 27–33)
MCHC RBC AUTO-ENTMCNC: 32.3 G/DL (ref 33.6–35)
MCV RBC AUTO: 97.7 FL (ref 81.4–97.8)
MONOCYTES # BLD AUTO: 0.57 K/UL (ref 0–0.85)
MONOCYTES NFR BLD AUTO: 7.6 % (ref 0–13.4)
NEUTROPHILS # BLD AUTO: 4.67 K/UL (ref 2–7.15)
NEUTROPHILS NFR BLD: 62.6 % (ref 44–72)
NRBC # BLD AUTO: 0 K/UL
NRBC BLD-RTO: 0 /100 WBC
PLATELET # BLD AUTO: 394 K/UL (ref 164–446)
PMV BLD AUTO: 9.9 FL (ref 9–12.9)
POTASSIUM SERPL-SCNC: 4.2 MMOL/L (ref 3.6–5.5)
PROT SERPL-MCNC: 7.2 G/DL (ref 6–8.2)
RBC # BLD AUTO: 3.99 M/UL (ref 4.2–5.4)
SODIUM SERPL-SCNC: 138 MMOL/L (ref 135–145)
TIBC SERPL-MCNC: 306 UG/DL (ref 250–450)
TRIGL SERPL-MCNC: 101 MG/DL (ref 0–149)
UIBC SERPL-MCNC: 194 UG/DL (ref 110–370)
WBC # BLD AUTO: 7.5 K/UL (ref 4.8–10.8)

## 2020-09-30 PROCEDURE — 36415 COLL VENOUS BLD VENIPUNCTURE: CPT

## 2020-09-30 PROCEDURE — 80061 LIPID PANEL: CPT

## 2020-09-30 PROCEDURE — 85025 COMPLETE CBC W/AUTO DIFF WBC: CPT

## 2020-09-30 PROCEDURE — 83036 HEMOGLOBIN GLYCOSYLATED A1C: CPT

## 2020-09-30 PROCEDURE — 83540 ASSAY OF IRON: CPT

## 2020-09-30 PROCEDURE — 80053 COMPREHEN METABOLIC PANEL: CPT

## 2020-09-30 PROCEDURE — 83550 IRON BINDING TEST: CPT

## 2021-03-01 ENCOUNTER — TELEPHONE (OUTPATIENT)
Dept: SCHEDULING | Facility: IMAGING CENTER | Age: 56
End: 2021-03-01

## 2021-03-15 DIAGNOSIS — Z23 NEED FOR VACCINATION: ICD-10-CM

## 2021-03-22 SDOH — ECONOMIC STABILITY: INCOME INSECURITY: IN THE LAST 12 MONTHS, WAS THERE A TIME WHEN YOU WERE NOT ABLE TO PAY THE MORTGAGE OR RENT ON TIME?: NO

## 2021-03-22 SDOH — ECONOMIC STABILITY: HOUSING INSECURITY: IN THE LAST 12 MONTHS, HOW MANY PLACES HAVE YOU LIVED?: 1

## 2021-03-22 SDOH — ECONOMIC STABILITY: HOUSING INSECURITY
IN THE LAST 12 MONTHS, WAS THERE A TIME WHEN YOU DID NOT HAVE A STEADY PLACE TO SLEEP OR SLEPT IN A SHELTER (INCLUDING NOW)?: NO

## 2021-03-22 SDOH — ECONOMIC STABILITY: TRANSPORTATION INSECURITY
IN THE PAST 12 MONTHS, HAS LACK OF RELIABLE TRANSPORTATION KEPT YOU FROM MEDICAL APPOINTMENTS, MEETINGS, WORK OR FROM GETTING THINGS NEEDED FOR DAILY LIVING?: NO

## 2021-03-22 SDOH — ECONOMIC STABILITY: TRANSPORTATION INSECURITY
IN THE PAST 12 MONTHS, HAS THE LACK OF TRANSPORTATION KEPT YOU FROM MEDICAL APPOINTMENTS OR FROM GETTING MEDICATIONS?: NO

## 2021-03-22 SDOH — HEALTH STABILITY: PHYSICAL HEALTH: ON AVERAGE, HOW MANY MINUTES DO YOU ENGAGE IN EXERCISE AT THIS LEVEL?: 30 MINUTES

## 2021-03-22 SDOH — HEALTH STABILITY: MENTAL HEALTH
STRESS IS WHEN SOMEONE FEELS TENSE, NERVOUS, ANXIOUS, OR CAN'T SLEEP AT NIGHT BECAUSE THEIR MIND IS TROUBLED. HOW STRESSED ARE YOU?: TO SOME EXTENT

## 2021-03-22 SDOH — HEALTH STABILITY: PHYSICAL HEALTH: ON AVERAGE, HOW MANY DAYS PER WEEK DO YOU ENGAGE IN MODERATE TO STRENUOUS EXERCISE (LIKE A BRISK WALK)?: 1 DAY

## 2021-03-22 ASSESSMENT — SOCIAL DETERMINANTS OF HEALTH (SDOH)
HOW HARD IS IT FOR YOU TO PAY FOR THE VERY BASICS LIKE FOOD, HOUSING, MEDICAL CARE, AND HEATING?: DECLINE
HOW OFTEN DO YOU GET TOGETHER WITH FRIENDS OR RELATIVES?: DECLINE
HOW OFTEN DO YOU HAVE A DRINK CONTAINING ALCOHOL: 2-3 TIMES A WEEK
HOW OFTEN DO YOU HAVE SIX OR MORE DRINKS ON ONE OCCASION: NEVER
HOW OFTEN DO YOU ATTEND CHURCH OR RELIGIOUS SERVICES?: MORE THAN 4 TIMES PER YEAR
DO YOU BELONG TO ANY CLUBS OR ORGANIZATIONS SUCH AS CHURCH GROUPS UNIONS, FRATERNAL OR ATHLETIC GROUPS, OR SCHOOL GROUPS?: DECLINE
HOW OFTEN DO YOU ATTENT MEETINGS OF THE CLUB OR ORGANIZATION YOU BELONG TO?: DECLINE
WITHIN THE PAST 12 MONTHS, THE FOOD YOU BOUGHT JUST DIDN'T LAST AND YOU DIDN'T HAVE MONEY TO GET MORE: NEVER TRUE
IN A TYPICAL WEEK, HOW MANY TIMES DO YOU TALK ON THE PHONE WITH FAMILY, FRIENDS, OR NEIGHBORS?: THREE TIMES A WEEK
HOW MANY DRINKS CONTAINING ALCOHOL DO YOU HAVE ON A TYPICAL DAY WHEN YOU ARE DRINKING: 1 OR 2
WITHIN THE PAST 12 MONTHS, YOU WORRIED THAT YOUR FOOD WOULD RUN OUT BEFORE YOU GOT THE MONEY TO BUY MORE: NEVER TRUE

## 2021-03-23 ASSESSMENT — ENCOUNTER SYMPTOMS
CONSTIPATION: 0
SHORTNESS OF BREATH: 0
DIARRHEA: 0
WEAKNESS: 0
VOMITING: 0
DEPRESSION: 0
FEVER: 0
PALPITATIONS: 0
CHILLS: 0
NAUSEA: 0
BLURRED VISION: 0

## 2021-03-23 NOTE — PROGRESS NOTES
History of Present Illness  55 year old female presents to clinic to establish care.  She has a history of of hyperlipidemia and is using rosuvastatin and Zetia for this.  She does get some back pain associated with her rosuvastatin, but takes co-Q10 enzyme which helps.  She denies any other side effects, no GI upset.     She has mild intermittent asthma and uses Singulair and albuterol as needed.  She needs to use the albuterol an average of 1-2 times per week.  She denies any new cough or shortness of breath.    She reports she has ADD and uses Adderall daily for this.  She has never had any formal testing for it.  She started the Adderall about 2 to 3 years ago.  She takes it every day, even on the weekends.  She has never had a drug holiday from this.  She works as a  and a  for a local .    She has depression and anxiety. Mood is stable, with no depression or manic episodes. Motivation, and concentration are good. She denies any panic attacks. She denies any thoughts of self-harm, suicide, or harm to others.     She denies any other questions or concerns at this time.    ROS  Review of Systems   Constitutional: Negative for chills and fever.   HENT: Negative for hearing loss.    Eyes: Negative for blurred vision.   Respiratory: Negative for shortness of breath.    Cardiovascular: Negative for chest pain and palpitations.   Gastrointestinal: Negative for constipation, diarrhea, nausea and vomiting.   Genitourinary: Negative for dysuria and hematuria.   Skin: Negative for rash.   Neurological: Negative for weakness.   Psychiatric/Behavioral: Negative for depression.     Medications  Current Outpatient Medications   Medication Sig Dispense Refill   • albuterol 108 (90 Base) MCG/ACT Aero Soln inhalation aerosol      • amphetamine-dextroamphetamine (ADDERALL XR) 20 MG per XR capsule Take  by mouth every day.     • montelukast (SINGULAIR) 10 MG Tab Take  by mouth every day.     •  "pantoprazole (PROTONIX) 40 MG Tablet Delayed Response as needed.     • fluticasone (FLONASE) 50 MCG/ACT nasal spray Administer 1 Spray into affected nostril(S) every day.     • rosuvastatin (CRESTOR) 10 MG Tab Take 1 tablet by mouth every day. 90 tablet 3   • ezetimibe (ZETIA) 10 MG Tab Take 1 tablet by mouth every day. 90 tablet 0   • buPROPion (WELLBUTRIN XL) 300 MG XL tablet Take 1 tablet by mouth every morning. 90 tablet 3   • citalopram (CELEXA) 40 MG Tab Take 1 tablet by mouth every day. 90 tablet 3     No current facility-administered medications for this visit.     Allergies  Allergies   Allergen Reactions   • Bactrim [Sulfamethoxazole W-Trimethoprim] Hives   • Pcn [Penicillins] Unspecified     \"I lose my coordination and act like I'm drunk\"     Problem List  Patient Active Problem List   Diagnosis   • Mixed hyperlipidemia   • Mild intermittent asthma without complication   • Overweight (BMI 25.0-29.9)   • Generalized anxiety disorder   • Recurrent major depressive disorder, in full remission (HCC)     Past Medical History  Past Medical History:   Diagnosis Date   • Anxiety    • Depression    • Hyperlipidemia      Past Surgical History  Past Surgical History:   Procedure Laterality Date   • DENTAL EXTRACTION(S)  2017     Past Family History  Family History   Problem Relation Age of Onset   • Hypertension Mother      Social History  She reports eating a healthy and balanced diet, but does not get regular exercise. 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 She drinks an average of 6-8 alcoholic beverages per week. She denies any tobacco product or illicit drug use. She is sexually active with one, male partner and they do not use any form of contraception.     Physical Exam  /78 (BP Location: Left arm, Patient Position: Sitting, BP Cuff Size: Adult)   Pulse 81   Temp 36.9 °C (98.4 °F) (Temporal)   Resp 16   Ht 1.6 m (5' 3\")   Wt 69.9 kg (154 lb)   SpO2 " 96%   BMI 27.28 kg/m²   Physical Exam   Constitutional: She is well-developed, well-nourished, and in no distress. No distress.   HENT:   Head: Normocephalic and atraumatic.   Right Ear: Tympanic membrane, external ear and ear canal normal.   Left Ear: Tympanic membrane, external ear and ear canal normal.   Eyes: Pupils are equal, round, and reactive to light. Right eye exhibits no discharge. Left eye exhibits no discharge. No scleral icterus.   Neck: No thyromegaly present.   Cardiovascular: Normal rate, regular rhythm and normal heart sounds.   Pulmonary/Chest: Effort normal and breath sounds normal. No respiratory distress.   Abdominal: Soft. Bowel sounds are normal. She exhibits no distension. There is no abdominal tenderness.   Musculoskeletal:         General: No edema.   Neurological: She is alert.   Skin: Skin is warm and dry. She is not diaphoretic.   Psychiatric: Affect and judgment normal.     Assessment & Plan  1. Visit for preventive health examination  2. Overweight (BMI 25.0-29.9)  3. Need for hepatitis C screening test  4. Encounter for screening mammogram for breast cancer  Health maintenance status reviewed and updated. We discussed diet, exercise, vaccinations, skin cancer prevention and detection, seat belt use, and regular eye and dental exams.  - HCV QUANTASURE (PLUS); Future  - HEMOGLOBIN A1C; Future  - MA-SCREENING MAMMO BILAT W/CAD; Future    5. Mixed hyperlipidemia  Chronic and stable.  Continue with rosuvastatin and Zetia.  - rosuvastatin (CRESTOR) 10 MG Tab; Take 1 tablet by mouth every day.  Dispense: 90 tablet; Refill: 3  - ezetimibe (ZETIA) 10 MG Tab; Take 1 tablet by mouth every day.  Dispense: 90 tablet; Refill: 0    6. Mild intermittent asthma without complication  Chronic and stable.  Continue with albuterol as needed.    7. Difficulty concentrating  Chronic and stable problem.  I have placed a referral to behavioral health so that she may have formal testing for ADD versus ADHD.   PDMP was reviewed today.  She will return to clinic for refill of Adderall, and is aware that she will need to sign a controlled substance agreement at that time.  - REFERRAL TO BEHAVIORAL HEALTH    8. Generalized anxiety disorder  9. Recurrent major depressive disorder, in full remission (HCC)  Chronic and stable.  Continue with Wellbutrin and Celexa.  - buPROPion (WELLBUTRIN XL) 300 MG XL tablet; Take 1 tablet by mouth every morning.  Dispense: 90 tablet; Refill: 3  - citalopram (CELEXA) 40 MG Tab; Take 1 tablet by mouth every day.  Dispense: 90 tablet; Refill: 3   3/26/2021   PHQ 1   TOMI 1       Return in about 2 weeks (around 4/9/2021) for ADD medication.    Laurel Law M.D.

## 2021-03-26 ENCOUNTER — OFFICE VISIT (OUTPATIENT)
Dept: MEDICAL GROUP | Facility: MEDICAL CENTER | Age: 56
End: 2021-03-26
Payer: COMMERCIAL

## 2021-03-26 VITALS
WEIGHT: 154 LBS | HEART RATE: 81 BPM | RESPIRATION RATE: 16 BRPM | HEIGHT: 63 IN | DIASTOLIC BLOOD PRESSURE: 78 MMHG | BODY MASS INDEX: 27.29 KG/M2 | SYSTOLIC BLOOD PRESSURE: 142 MMHG | TEMPERATURE: 98.4 F | OXYGEN SATURATION: 96 %

## 2021-03-26 DIAGNOSIS — Z00.00 VISIT FOR PREVENTIVE HEALTH EXAMINATION: ICD-10-CM

## 2021-03-26 DIAGNOSIS — E66.3 OVERWEIGHT (BMI 25.0-29.9): ICD-10-CM

## 2021-03-26 DIAGNOSIS — F33.42 RECURRENT MAJOR DEPRESSIVE DISORDER, IN FULL REMISSION (HCC): ICD-10-CM

## 2021-03-26 DIAGNOSIS — Z12.31 ENCOUNTER FOR SCREENING MAMMOGRAM FOR BREAST CANCER: ICD-10-CM

## 2021-03-26 DIAGNOSIS — F41.1 GENERALIZED ANXIETY DISORDER: ICD-10-CM

## 2021-03-26 DIAGNOSIS — E78.2 MIXED HYPERLIPIDEMIA: ICD-10-CM

## 2021-03-26 DIAGNOSIS — Z11.59 NEED FOR HEPATITIS C SCREENING TEST: ICD-10-CM

## 2021-03-26 DIAGNOSIS — J45.20 MILD INTERMITTENT ASTHMA WITHOUT COMPLICATION: ICD-10-CM

## 2021-03-26 DIAGNOSIS — R41.840 DIFFICULTY CONCENTRATING: ICD-10-CM

## 2021-03-26 PROCEDURE — 99386 PREV VISIT NEW AGE 40-64: CPT | Performed by: FAMILY MEDICINE

## 2021-03-26 RX ORDER — FLUTICASONE PROPIONATE 50 MCG
1 SPRAY, SUSPENSION (ML) NASAL DAILY
COMMUNITY
End: 2021-06-30 | Stop reason: SDUPTHER

## 2021-03-26 RX ORDER — DEXTROAMPHETAMINE SACCHARATE, AMPHETAMINE ASPARTATE MONOHYDRATE, DEXTROAMPHETAMINE SULFATE AND AMPHETAMINE SULFATE 5; 5; 5; 5 MG/1; MG/1; MG/1; MG/1
CAPSULE, EXTENDED RELEASE ORAL DAILY
COMMUNITY
Start: 2021-03-01 | End: 2021-04-02 | Stop reason: SDUPTHER

## 2021-03-26 RX ORDER — EZETIMIBE 10 MG/1
10 TABLET ORAL DAILY
COMMUNITY
End: 2021-03-26 | Stop reason: SDUPTHER

## 2021-03-26 RX ORDER — EZETIMIBE 10 MG/1
10 TABLET ORAL DAILY
Qty: 90 TABLET | Refills: 0 | Status: SHIPPED | OUTPATIENT
Start: 2021-03-26 | End: 2021-06-30 | Stop reason: SDUPTHER

## 2021-03-26 RX ORDER — MONTELUKAST SODIUM 10 MG/1
TABLET ORAL DAILY
COMMUNITY
Start: 2021-01-06 | End: 2021-06-30 | Stop reason: SDUPTHER

## 2021-03-26 RX ORDER — PANTOPRAZOLE SODIUM 40 MG/1
TABLET, DELAYED RELEASE ORAL PRN
COMMUNITY
Start: 2021-01-06 | End: 2021-06-30 | Stop reason: SDUPTHER

## 2021-03-26 RX ORDER — ALBUTEROL SULFATE 90 UG/1
AEROSOL, METERED RESPIRATORY (INHALATION)
COMMUNITY
Start: 2021-01-06

## 2021-03-26 RX ORDER — ROSUVASTATIN CALCIUM 10 MG/1
10 TABLET, COATED ORAL DAILY
COMMUNITY
End: 2021-03-26 | Stop reason: SDUPTHER

## 2021-03-26 RX ORDER — BUPROPION HYDROCHLORIDE 300 MG/1
300 TABLET ORAL EVERY MORNING
Qty: 90 TABLET | Refills: 3 | Status: SHIPPED | OUTPATIENT
Start: 2021-03-26 | End: 2021-06-30 | Stop reason: SDUPTHER

## 2021-03-26 RX ORDER — ROSUVASTATIN CALCIUM 10 MG/1
10 TABLET, COATED ORAL DAILY
Qty: 90 TABLET | Refills: 3 | Status: SHIPPED | OUTPATIENT
Start: 2021-03-26 | End: 2021-06-30 | Stop reason: SDUPTHER

## 2021-03-26 RX ORDER — CITALOPRAM 40 MG/1
40 TABLET ORAL DAILY
Qty: 90 TABLET | Refills: 3 | Status: SHIPPED | OUTPATIENT
Start: 2021-03-26 | End: 2021-06-30 | Stop reason: SDUPTHER

## 2021-03-26 ASSESSMENT — PATIENT HEALTH QUESTIONNAIRE - PHQ9
1. LITTLE INTEREST OR PLEASURE IN DOING THINGS: NOT AT ALL
8. MOVING OR SPEAKING SO SLOWLY THAT OTHER PEOPLE COULD HAVE NOTICED. OR THE OPPOSITE, BEING SO FIGETY OR RESTLESS THAT YOU HAVE BEEN MOVING AROUND A LOT MORE THAN USUAL: NOT AT ALL
SUM OF ALL RESPONSES TO PHQ9 QUESTIONS 1 AND 2: 1
7. TROUBLE CONCENTRATING ON THINGS, SUCH AS READING THE NEWSPAPER OR WATCHING TELEVISION: NOT AT ALL
9. THOUGHTS THAT YOU WOULD BE BETTER OFF DEAD, OR OF HURTING YOURSELF: NOT AT ALL
2. FEELING DOWN, DEPRESSED, IRRITABLE, OR HOPELESS: SEVERAL DAYS
5. POOR APPETITE OR OVEREATING: NOT AT ALL
SUM OF ALL RESPONSES TO PHQ QUESTIONS 1-9: 1
6. FEELING BAD ABOUT YOURSELF - OR THAT YOU ARE A FAILURE OR HAVE LET YOURSELF OR YOUR FAMILY DOWN: NOT AL ALL
3. TROUBLE FALLING OR STAYING ASLEEP OR SLEEPING TOO MUCH: NOT AT ALL
4. FEELING TIRED OR HAVING LITTLE ENERGY: NOT AT ALL
CLINICAL INTERPRETATION OF PHQ2 SCORE: 0

## 2021-03-26 ASSESSMENT — FIBROSIS 4 INDEX: FIB4 SCORE: 0.6

## 2021-03-26 ASSESSMENT — ANXIETY QUESTIONNAIRES
6. BECOMING EASILY ANNOYED OR IRRITABLE: NOT AT ALL
4. TROUBLE RELAXING: SEVERAL DAYS
2. NOT BEING ABLE TO STOP OR CONTROL WORRYING: NOT AT ALL
5. BEING SO RESTLESS THAT IT IS HARD TO SIT STILL: NOT AT ALL
GAD7 TOTAL SCORE: 1
3. WORRYING TOO MUCH ABOUT DIFFERENT THINGS: NOT AT ALL
1. FEELING NERVOUS, ANXIOUS, OR ON EDGE: NOT AT ALL
7. FEELING AFRAID AS IF SOMETHING AWFUL MIGHT HAPPEN: NOT AT ALL

## 2021-03-26 NOTE — LETTER
[facility]  Laurel Law M.D.  [address]  [place] [address]  Fax: [phone]   Authorization for Release/Disclosure of   Protected Health Information   Name: MERARI GARZON : 1965 SSN: [id number]   Address: [facility] [address] [place] [address] Phone:    [phone] (home)    I authorize the entity listed below to release/disclose the PHI below to:   [facility]/Laurel Law M.D. and Laurel Law M.D.   Provider or Entity Name:  {[facility] COLORECTAL SCREENING LOCATIONS:[id number]}   Reason for request: continuity of care   Information to be released:    [ X ] LAST COLONOSCOPY,  including any PATH REPORT and follow-up  [ X ] LAST FIT/COLOGUARD RESULT [  ] LAST DEXA  [  ] LAST MAMMOGRAM  [  ] LAST PAP  [  ] LAST LABS [  ] RETINA EXAM REPORT  [  ] IMMUNIZATION RECORDS  [  ] Release all info      [  ] Check here and initial the line next to each item to release ALL health information INCLUDING  _____ Care and treatment for drug and / or alcohol abuse  _____ HIV testing, infection status, or AIDS  _____ Genetic Testing    DATES OF SERVICE OR TIME PERIOD TO BE DISCLOSED: _____________  I understand and acknowledge that:  * This Authorization may be revoked at any time by you in writing, except if your health information has already been used or disclosed.  * Your health information that will be used or disclosed as a result of you signing this authorization could be re-disclosed by the recipient. If this occurs, your re-disclosed health information may no longer be protected by State or Federal laws.  * You may refuse to sign this Authorization. Your refusal will not affect your ability to obtain treatment.  * This Authorization becomes effective upon signing and will  on (date) __________.      If no date is indicated, this Authorization will  one (1) year from the signature date.    Name: Merari Garzon    Signature:   Date:     3/26/2021       PLEASE FAX  REQUESTED RECORDS BACK TO: [phone]

## 2021-03-26 NOTE — LETTER
[facility]  Laurel Law M.D.  [address]  [place] [address]  Fax: [phone]   Authorization for Release/Disclosure of   Protected Health Information   Name: LEONIE GARZON : 1965 SSN: [id number]   Address: [address] [place] [address] Phone:    [phone] (home)    I authorize the entity listed below to release/disclose the PHI below to:   [facility]/Laurel Law M.D. and Laurel Law M.D.   Provider or Entity Name:  [facility]   Address   City, [facility], Zip:               [address] [place] [address], [place] [address]   Phone:  [phone]      Fax:      [phone]        Reason for request: continuity of care   Information to be released:    [ X ] LAST COLONOSCOPY,  including any PATH REPORT and follow-up  [ X ] LAST FIT/COLOGUARD RESULT [  ] LAST DEXA  [  ] LAST MAMMOGRAM  [  ] LAST PAP  [  ] LAST LABS [  ] RETINA EXAM REPORT  [  ] IMMUNIZATION RECORDS  [  ] Release all info      [  ] Check here and initial the line next to each item to release ALL health information INCLUDING  _____ Care and treatment for drug and / or alcohol abuse  _____ HIV testing, infection status, or AIDS  _____ Genetic Testing    DATES OF SERVICE OR TIME PERIOD TO BE DISCLOSED: _____________  I understand and acknowledge that:  * This Authorization may be revoked at any time by you in writing, except if your health information has already been used or disclosed.  * Your health information that will be used or disclosed as a result of you signing this authorization could be re-disclosed by the recipient. If this occurs, your re-disclosed health information may no longer be protected by State or Federal laws.  * You may refuse to sign this Authorization. Your refusal will not affect your ability to obtain treatment.  * This Authorization becomes effective upon signing and will  on (date) __________.      If no date is indicated, this Authorization will  one  (1) year from the signature date.    Name: Merari Paulson    Signature:   Date:     3/26/2021       PLEASE FAX REQUESTED RECORDS BACK TO: [phone]

## 2021-03-30 NOTE — PROGRESS NOTES
"History of Present Illness  55 year old female presents to clinic for ADD management, she was last seen for this 3/26/2021.  During that visit I did place a referral to behavioral health for her to have formal testing done as she has never had this in the past.    She uses Adderall 20 mg daily for her ADD.  She has never had a drug holiday, and takes her medications even on the weekend. She states this helps her significantly with her focus and concentration, especially at work. She works as a  and a  for a local .      She denies any other questions or concerns at this time.    Current problem list, current medication, and past medical/surgical history were reviewed.     ROS  See HPI    Physical Exam  /80 (BP Location: Left arm, Patient Position: Sitting, BP Cuff Size: Adult)   Pulse 72   Temp 36.6 °C (97.9 °F) (Temporal)   Resp 18   Ht 1.6 m (5' 3\")   Wt 71 kg (156 lb 8.4 oz)   SpO2 96%   BMI 27.73 kg/m²   Physical Exam   Constitutional: She is well-developed, well-nourished, and in no distress. No distress.   Cardiovascular: Normal rate, regular rhythm and normal heart sounds.   Pulmonary/Chest: Effort normal and breath sounds normal. No respiratory distress.   Abdominal: Soft. Bowel sounds are normal.   Neurological: She is alert.   Skin: Skin is warm and dry. She is not diaphoretic.   Psychiatric: Affect and judgment normal.     Assessment & Plan  1. Difficulty concentrating  Controlled substance agreement signed and scanned into chart. Annual drug screen will be obtained throughout the year. Obtained and reviewed patient utilization report from [facility] pharmacy database on 4/2/2021 8:43 AM  prior to writing prescription for controlled substance II, III or IV per [place] bill . Based on assessment of the report, my physical exam, and the patient's health problem, the prescription is medically necessary.   - amphetamine-dextroamphetamine (ADDERALL XR) 20 MG per " XR capsule; Take 1 capsule by mouth every day for 30 days.  Dispense: 30 capsule; Refill: 0  - amphetamine-dextroamphetamine (ADDERALL) 20 MG Tab; Take 1 tablet by mouth 2 times a day for 30 days.  Dispense: 60 Each; Refill: 0  - amphetamine-dextroamphetamine (ADDERALL) 20 MG Tab; Take 1 tablet by mouth 2 times a day for 30 days.  Dispense: 60 Each; Refill: 0  - Controlled Substance Treatment Agreement    Return in about 3 months (around 7/2/2021) for ADD management.    Laurel Law M.D.

## 2021-04-02 ENCOUNTER — OFFICE VISIT (OUTPATIENT)
Dept: MEDICAL GROUP | Facility: MEDICAL CENTER | Age: 56
End: 2021-04-02
Payer: COMMERCIAL

## 2021-04-02 VITALS
WEIGHT: 156.53 LBS | RESPIRATION RATE: 18 BRPM | OXYGEN SATURATION: 96 % | SYSTOLIC BLOOD PRESSURE: 118 MMHG | DIASTOLIC BLOOD PRESSURE: 80 MMHG | TEMPERATURE: 97.9 F | BODY MASS INDEX: 27.73 KG/M2 | HEART RATE: 72 BPM | HEIGHT: 63 IN

## 2021-04-02 DIAGNOSIS — R41.840 DIFFICULTY CONCENTRATING: ICD-10-CM

## 2021-04-02 PROCEDURE — 99213 OFFICE O/P EST LOW 20 MIN: CPT | Performed by: FAMILY MEDICINE

## 2021-04-02 RX ORDER — DEXTROAMPHETAMINE SACCHARATE, AMPHETAMINE ASPARTATE, DEXTROAMPHETAMINE SULFATE AND AMPHETAMINE SULFATE 5; 5; 5; 5 MG/1; MG/1; MG/1; MG/1
20 TABLET ORAL 2 TIMES DAILY
Qty: 60 EACH | Refills: 0 | Status: SHIPPED | OUTPATIENT
Start: 2021-05-02 | End: 2021-06-01

## 2021-04-02 RX ORDER — DEXTROAMPHETAMINE SACCHARATE, AMPHETAMINE ASPARTATE, DEXTROAMPHETAMINE SULFATE AND AMPHETAMINE SULFATE 5; 5; 5; 5 MG/1; MG/1; MG/1; MG/1
20 TABLET ORAL 2 TIMES DAILY
Qty: 60 EACH | Refills: 0 | Status: SHIPPED
Start: 2021-06-02 | End: 2021-06-30

## 2021-04-02 RX ORDER — DEXTROAMPHETAMINE SACCHARATE, AMPHETAMINE ASPARTATE MONOHYDRATE, DEXTROAMPHETAMINE SULFATE AND AMPHETAMINE SULFATE 5; 5; 5; 5 MG/1; MG/1; MG/1; MG/1
20 CAPSULE, EXTENDED RELEASE ORAL DAILY
Qty: 30 CAPSULE | Refills: 0 | Status: SHIPPED | OUTPATIENT
Start: 2021-04-02 | End: 2021-05-02

## 2021-04-02 ASSESSMENT — FIBROSIS 4 INDEX: FIB4 SCORE: 0.6

## 2021-05-03 DIAGNOSIS — R41.840 DIFFICULTY CONCENTRATING: ICD-10-CM

## 2021-06-22 NOTE — PROGRESS NOTES
"History of Present Illness  55 year old female presents to clinic for ADD management.  She had previously been taking Ritalin 20 mg extended release daily for this, but not had any prior testing to confirm diagnoses.  She has been trying to get in to get testing done for several months now, and does have an appointment for this tomorrow.  Unfortunately, I have been sending Ritalin 20 mg immediate release 2 times daily for the last 2 months as her medication was not properly updated in the chart.  She has been doing well with this.  She does work for a local , and finds the Adderall helps with concentration significantly.  She does take her medication 7 days a week, without any drug holidays.  She has not had any side effects from the medication.    She denies any other questions or concerns at this time.    Current problem list, current medication, and past medical/surgical history were reviewed.     ROS  See HPI    Physical Exam  /70 (BP Location: Left arm, Patient Position: Sitting, BP Cuff Size: Adult)   Pulse 64   Temp 36.4 °C (97.6 °F) (Temporal)   Ht 1.6 m (5' 3\")   Wt 68.1 kg (150 lb 2.1 oz)   SpO2 96%   BMI 26.59 kg/m²   Physical Exam  Constitutional:       General: She is not in acute distress.     Appearance: She is not diaphoretic.   Cardiovascular:      Rate and Rhythm: Normal rate and regular rhythm.      Heart sounds: Normal heart sounds.   Pulmonary:      Effort: Pulmonary effort is normal. No respiratory distress.      Breath sounds: Normal breath sounds.   Abdominal:      General: Bowel sounds are normal.      Palpations: Abdomen is soft.   Skin:     General: Skin is warm and dry.   Neurological:      Mental Status: She is alert.   Psychiatric:         Mood and Affect: Affect normal.         Judgment: Judgment normal.       Assessment & Plan  1. Difficulty concentrating  We have switched her back to Adderall extended release 20 mg daily.  I have sent in 3 months worth of this " medication.  PDMP was reviewed today.  She has her formal testing tomorrow, and we will get those results.  - amphetamine-dextroamphetamine XR (ADDERALL XR) 20 MG per XR capsule; Take 1 capsule by mouth every morning for 30 days.  Dispense: 30 capsule; Refill: 0  - amphetamine-dextroamphetamine XR (ADDERALL XR) 20 MG per XR capsule; Take 1 capsule by mouth every morning for 30 days.  Dispense: 30 capsule; Refill: 0  - amphetamine-dextroamphetamine XR (ADDERALL XR) 20 MG per XR capsule; Take 1 capsule by mouth every morning for 30 days.  Dispense: 30 capsule; Refill: 0    Return in about 3 months (around 9/30/2021) for ADHD management .    Laurel Law M.D.

## 2021-06-29 ENCOUNTER — HOSPITAL ENCOUNTER (OUTPATIENT)
Dept: LAB | Facility: MEDICAL CENTER | Age: 56
End: 2021-06-29
Attending: FAMILY MEDICINE
Payer: COMMERCIAL

## 2021-06-29 DIAGNOSIS — Z00.00 VISIT FOR PREVENTIVE HEALTH EXAMINATION: ICD-10-CM

## 2021-06-29 DIAGNOSIS — Z11.59 NEED FOR HEPATITIS C SCREENING TEST: ICD-10-CM

## 2021-06-29 LAB
EST. AVERAGE GLUCOSE BLD GHB EST-MCNC: 111 MG/DL
HBA1C MFR BLD: 5.5 % (ref 4–5.6)

## 2021-06-29 PROCEDURE — 36415 COLL VENOUS BLD VENIPUNCTURE: CPT

## 2021-06-29 PROCEDURE — 83036 HEMOGLOBIN GLYCOSYLATED A1C: CPT

## 2021-06-29 PROCEDURE — 87522 HEPATITIS C REVRS TRNSCRPJ: CPT

## 2021-06-30 ENCOUNTER — OFFICE VISIT (OUTPATIENT)
Dept: MEDICAL GROUP | Facility: MEDICAL CENTER | Age: 56
End: 2021-06-30
Payer: COMMERCIAL

## 2021-06-30 VITALS
OXYGEN SATURATION: 96 % | WEIGHT: 150.13 LBS | BODY MASS INDEX: 26.6 KG/M2 | DIASTOLIC BLOOD PRESSURE: 70 MMHG | TEMPERATURE: 97.6 F | HEIGHT: 63 IN | HEART RATE: 64 BPM | SYSTOLIC BLOOD PRESSURE: 130 MMHG

## 2021-06-30 DIAGNOSIS — R41.840 DIFFICULTY CONCENTRATING: ICD-10-CM

## 2021-06-30 DIAGNOSIS — F33.42 RECURRENT MAJOR DEPRESSIVE DISORDER, IN FULL REMISSION (HCC): ICD-10-CM

## 2021-06-30 DIAGNOSIS — E78.2 MIXED HYPERLIPIDEMIA: ICD-10-CM

## 2021-06-30 DIAGNOSIS — F41.1 GENERALIZED ANXIETY DISORDER: ICD-10-CM

## 2021-06-30 PROCEDURE — 99214 OFFICE O/P EST MOD 30 MIN: CPT | Performed by: FAMILY MEDICINE

## 2021-06-30 RX ORDER — FLUTICASONE PROPIONATE 50 MCG
1 SPRAY, SUSPENSION (ML) NASAL DAILY
Qty: 16 G | Refills: 1 | Status: SHIPPED | OUTPATIENT
Start: 2021-06-30

## 2021-06-30 RX ORDER — BUPROPION HYDROCHLORIDE 300 MG/1
300 TABLET ORAL EVERY MORNING
Qty: 90 TABLET | Refills: 3 | Status: SHIPPED | OUTPATIENT
Start: 2021-06-30 | End: 2022-02-10 | Stop reason: SDUPTHER

## 2021-06-30 RX ORDER — EZETIMIBE 10 MG/1
10 TABLET ORAL DAILY
Qty: 90 TABLET | Refills: 0 | Status: SHIPPED
Start: 2021-06-30 | End: 2021-10-11

## 2021-06-30 RX ORDER — CITALOPRAM 40 MG/1
40 TABLET ORAL DAILY
Qty: 90 TABLET | Refills: 3 | Status: SHIPPED | OUTPATIENT
Start: 2021-06-30 | End: 2022-02-10 | Stop reason: SDUPTHER

## 2021-06-30 RX ORDER — DEXTROAMPHETAMINE SACCHARATE, AMPHETAMINE ASPARTATE MONOHYDRATE, DEXTROAMPHETAMINE SULFATE AND AMPHETAMINE SULFATE 5; 5; 5; 5 MG/1; MG/1; MG/1; MG/1
20 CAPSULE, EXTENDED RELEASE ORAL EVERY MORNING
Qty: 30 CAPSULE | Refills: 0 | Status: SHIPPED | OUTPATIENT
Start: 2021-07-14 | End: 2021-08-13

## 2021-06-30 RX ORDER — PANTOPRAZOLE SODIUM 40 MG/1
20 TABLET, DELAYED RELEASE ORAL PRN
Qty: 90 TABLET | Refills: 1 | Status: SHIPPED | OUTPATIENT
Start: 2021-06-30 | End: 2021-07-06 | Stop reason: SDUPTHER

## 2021-06-30 RX ORDER — DEXTROAMPHETAMINE SACCHARATE, AMPHETAMINE ASPARTATE MONOHYDRATE, DEXTROAMPHETAMINE SULFATE AND AMPHETAMINE SULFATE 5; 5; 5; 5 MG/1; MG/1; MG/1; MG/1
20 CAPSULE, EXTENDED RELEASE ORAL EVERY MORNING
Qty: 30 CAPSULE | Refills: 0 | Status: SHIPPED | OUTPATIENT
Start: 2021-09-14 | End: 2021-10-11 | Stop reason: SDUPTHER

## 2021-06-30 RX ORDER — ROSUVASTATIN CALCIUM 10 MG/1
10 TABLET, COATED ORAL DAILY
Qty: 90 TABLET | Refills: 3 | Status: SHIPPED
Start: 2021-06-30 | End: 2021-10-11

## 2021-06-30 RX ORDER — MONTELUKAST SODIUM 10 MG/1
10 TABLET ORAL DAILY
Qty: 90 TABLET | Refills: 1 | Status: SHIPPED
Start: 2021-06-30 | End: 2021-08-31

## 2021-06-30 RX ORDER — DEXTROAMPHETAMINE SACCHARATE, AMPHETAMINE ASPARTATE MONOHYDRATE, DEXTROAMPHETAMINE SULFATE AND AMPHETAMINE SULFATE 5; 5; 5; 5 MG/1; MG/1; MG/1; MG/1
20 CAPSULE, EXTENDED RELEASE ORAL EVERY MORNING
Qty: 30 CAPSULE | Refills: 0 | Status: SHIPPED | OUTPATIENT
Start: 2021-08-14 | End: 2021-09-13

## 2021-06-30 ASSESSMENT — FIBROSIS 4 INDEX: FIB4 SCORE: 0.6

## 2021-07-01 ENCOUNTER — OFFICE VISIT (OUTPATIENT)
Dept: BEHAVIORAL HEALTH | Facility: CLINIC | Age: 56
End: 2021-07-01
Payer: COMMERCIAL

## 2021-07-01 DIAGNOSIS — F90.0 ATTENTION DEFICIT HYPERACTIVITY DISORDER (ADHD), INATTENTIVE TYPE, MILD: ICD-10-CM

## 2021-07-01 PROCEDURE — 90791 PSYCH DIAGNOSTIC EVALUATION: CPT | Mod: 59 | Performed by: PSYCHOLOGIST

## 2021-07-01 PROCEDURE — 96137 PSYCL/NRPSYC TST PHY/QHP EA: CPT | Performed by: PSYCHOLOGIST

## 2021-07-01 PROCEDURE — 96136 PSYCL/NRPSYC TST PHY/QHP 1ST: CPT | Performed by: PSYCHOLOGIST
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
## 2021-07-02 LAB
HCV RNA SERPL NAA+PROBE-ACNC: NOT DETECTED IU/ML
HCV RNA SERPL NAA+PROBE-LOG IU: NOT DETECTED LOG IU/ML
HCV RNA SERPL QL NAA+PROBE: NOT DETECTED

## 2021-07-06 RX ORDER — PANTOPRAZOLE SODIUM 40 MG/1
20 TABLET, DELAYED RELEASE ORAL PRN
Qty: 90 TABLET | Refills: 1 | Status: SHIPPED | OUTPATIENT
Start: 2021-07-06

## 2021-07-06 NOTE — TELEPHONE ENCOUNTER
Received request via: Pharmacy    Was the patient seen in the last year in this department? Yes    Does the patient have an active prescription (recently filled or refills available) for medication(s) requested? Yes. Pharmacy is asking for frequency. Is it once or twice daily as needed? Thank you

## 2021-07-12 PROBLEM — F90.0 ATTENTION DEFICIT HYPERACTIVITY DISORDER (ADHD), INATTENTIVE TYPE, MILD: Status: ACTIVE | Noted: 2021-07-12

## 2021-07-12 PROCEDURE — 96130 PSYCL TST EVAL PHYS/QHP 1ST: CPT | Performed by: PSYCHOLOGIST

## 2021-08-30 NOTE — PROGRESS NOTES
"History of Present Illness  55 year old female presents to clinic for ADHD management.  Although she has been taking Ritalin 20 mg extended release daily for her ADHD management, she has not previously had any formal testing.  She was finally able to get this done 7/1/2021, unfortunately I am unable to see the results as sensitive notes are blocked. Merari was apparently not told anything about the results either.     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 She finds the Adderall helps with concentration significantly.  She does take her medication 7 days a week, without any drug holidays.  She has not had any side effects from the medication.    She denies any other questions or concerns at this time.    Current problem list, current medication, and past medical/surgical history were reviewed.     ROS  See HPI    Physical Exam  /62 (BP Location: Left arm, Patient Position: Sitting, BP Cuff Size: Adult)   Pulse 79   Temp 36.6 °C (97.9 °F) (Temporal)   Ht 1.6 m (5' 3\")   Wt 69.5 kg (153 lb 3.5 oz)   SpO2 97%   BMI 27.14 kg/m²   Physical Exam  Constitutional:       General: She is not in acute distress.     Appearance: She is not diaphoretic.   Cardiovascular:      Rate and Rhythm: Normal rate and regular rhythm.      Heart sounds: Normal heart sounds.   Pulmonary:      Effort: Pulmonary effort is normal. No respiratory distress.      Breath sounds: Normal breath sounds.   Abdominal:      General: Bowel sounds are normal.      Palpations: Abdomen is soft.   Skin:     General: Skin is warm and dry.   Neurological:      Mental Status: She is alert.   Psychiatric:         Mood and Affect: Affect normal.         Judgment: Judgment normal.       Assessment & Plan  1. Attention deficit hyperactivity disorder (ADHD), inattentive type, mild  Chronic and stable.  We will continue with her Adderall 20 mg extended release daily.  She does have 2 more months prescription already.  PDMP was " reviewed.  She does have a controlled substance agreement signed on file.    Return in about 2 months (around 10/31/2021) for establish care with [facility].    Laurel Law M.D.

## 2021-08-31 ENCOUNTER — OFFICE VISIT (OUTPATIENT)
Dept: MEDICAL GROUP | Facility: MEDICAL CENTER | Age: 56
End: 2021-08-31
Payer: COMMERCIAL

## 2021-08-31 VITALS
DIASTOLIC BLOOD PRESSURE: 62 MMHG | BODY MASS INDEX: 27.15 KG/M2 | WEIGHT: 153.22 LBS | OXYGEN SATURATION: 97 % | SYSTOLIC BLOOD PRESSURE: 124 MMHG | TEMPERATURE: 97.9 F | HEIGHT: 63 IN | HEART RATE: 79 BPM

## 2021-08-31 DIAGNOSIS — F90.0 ATTENTION DEFICIT HYPERACTIVITY DISORDER (ADHD), INATTENTIVE TYPE, MILD: ICD-10-CM

## 2021-08-31 PROCEDURE — 99213 OFFICE O/P EST LOW 20 MIN: CPT | Performed by: FAMILY MEDICINE

## 2021-08-31 ASSESSMENT — FIBROSIS 4 INDEX: FIB4 SCORE: 0.6

## 2021-09-22 ENCOUNTER — APPOINTMENT (RX ONLY)
Dept: URBAN - METROPOLITAN AREA CLINIC 4 | Facility: CLINIC | Age: 56
Setting detail: DERMATOLOGY
End: 2021-09-22

## 2021-09-22 DIAGNOSIS — D18.0 HEMANGIOMA: ICD-10-CM

## 2021-09-22 DIAGNOSIS — L81.4 OTHER MELANIN HYPERPIGMENTATION: ICD-10-CM

## 2021-09-22 DIAGNOSIS — L29.8 OTHER PRURITUS: ICD-10-CM | Status: WORSENING

## 2021-09-22 DIAGNOSIS — Z71.89 OTHER SPECIFIED COUNSELING: ICD-10-CM

## 2021-09-22 DIAGNOSIS — L82.1 OTHER SEBORRHEIC KERATOSIS: ICD-10-CM

## 2021-09-22 DIAGNOSIS — D22 MELANOCYTIC NEVI: ICD-10-CM

## 2021-09-22 DIAGNOSIS — L29.89 OTHER PRURITUS: ICD-10-CM | Status: WORSENING

## 2021-09-22 PROBLEM — L29.9 PRURITUS, UNSPECIFIED: Status: ACTIVE | Noted: 2021-09-22

## 2021-09-22 PROBLEM — D22.5 MELANOCYTIC NEVI OF TRUNK: Status: ACTIVE | Noted: 2021-09-22

## 2021-09-22 PROBLEM — D18.01 HEMANGIOMA OF SKIN AND SUBCUTANEOUS TISSUE: Status: ACTIVE | Noted: 2021-09-22

## 2021-09-22 PROCEDURE — ? PRESCRIPTION

## 2021-09-22 PROCEDURE — ? DIAGNOSIS COMMENT

## 2021-09-22 PROCEDURE — ? COUNSELING

## 2021-09-22 PROCEDURE — 99214 OFFICE O/P EST MOD 30 MIN: CPT

## 2021-09-22 RX ORDER — TRIAMCINOLONE ACETONIDE 1 MG/G
1 CREAM TOPICAL BID
Qty: 80 | Refills: 1 | Status: ERX | COMMUNITY
Start: 2021-09-22

## 2021-09-22 RX ADMIN — TRIAMCINOLONE ACETONIDE 1: 1 CREAM TOPICAL at 00:00

## 2021-09-22 ASSESSMENT — LOCATION DETAILED DESCRIPTION DERM
LOCATION DETAILED: SUPERIOR LUMBAR SPINE
LOCATION DETAILED: RIGHT SUPERIOR MEDIAL UPPER BACK
LOCATION DETAILED: RIGHT ANTERIOR PROXIMAL UPPER ARM
LOCATION DETAILED: RIGHT SUPERIOR UPPER BACK
LOCATION DETAILED: LEFT ANTERIOR PROXIMAL UPPER ARM
LOCATION DETAILED: RIGHT ANTERIOR DISTAL UPPER ARM
LOCATION DETAILED: LEFT INFERIOR UPPER BACK

## 2021-09-22 ASSESSMENT — LOCATION SIMPLE DESCRIPTION DERM
LOCATION SIMPLE: LEFT UPPER ARM
LOCATION SIMPLE: RIGHT UPPER BACK
LOCATION SIMPLE: LOWER BACK
LOCATION SIMPLE: RIGHT UPPER ARM
LOCATION SIMPLE: LEFT UPPER BACK

## 2021-09-22 ASSESSMENT — LOCATION ZONE DERM
LOCATION ZONE: TRUNK
LOCATION ZONE: ARM

## 2021-09-22 NOTE — HPI: RASH
What Type Of Note Output Would You Prefer (Optional)?: Standard Output
How Severe Is Your Rash?: moderate
Is This A New Presentation, Or A Follow-Up?: Rash
Additional History: When patient gets stressed she gets “itching spells”, uses OTC medications and creams to help the flares. Itching last days, once she takes a Benadryl it goes away after a couple hours.

## 2021-09-22 NOTE — PROCEDURE: DIAGNOSIS COMMENT
Render Risk Assessment In Note?: no
Comment: Patient has “itching spells” brought on by stress that leaves scars. Patient counseled in using sunscreen regularly and Rx of 0.1% triamcinolone cream bid for 1 prn for flares. Has not tried using ice to help. Pt states symptoms do change with the seasons. Dermatographism test is positive.
Detail Level: Simple

## 2021-10-11 ENCOUNTER — HOSPITAL ENCOUNTER (OUTPATIENT)
Facility: MEDICAL CENTER | Age: 56
End: 2021-10-11
Attending: FAMILY MEDICINE
Payer: COMMERCIAL

## 2021-10-11 ENCOUNTER — OFFICE VISIT (OUTPATIENT)
Dept: MEDICAL GROUP | Facility: MEDICAL CENTER | Age: 56
End: 2021-10-11
Payer: COMMERCIAL

## 2021-10-11 VITALS
HEIGHT: 63 IN | WEIGHT: 161.38 LBS | HEART RATE: 83 BPM | BODY MASS INDEX: 28.59 KG/M2 | DIASTOLIC BLOOD PRESSURE: 78 MMHG | OXYGEN SATURATION: 94 % | TEMPERATURE: 98.4 F | SYSTOLIC BLOOD PRESSURE: 126 MMHG

## 2021-10-11 DIAGNOSIS — Z12.31 ENCOUNTER FOR SCREENING MAMMOGRAM FOR MALIGNANT NEOPLASM OF BREAST: ICD-10-CM

## 2021-10-11 DIAGNOSIS — F90.0 ATTENTION DEFICIT HYPERACTIVITY DISORDER (ADHD), INATTENTIVE TYPE, MILD: ICD-10-CM

## 2021-10-11 DIAGNOSIS — R53.83 FATIGUE, UNSPECIFIED TYPE: ICD-10-CM

## 2021-10-11 DIAGNOSIS — Z11.59 ENCOUNTER FOR HEPATITIS C SCREENING TEST FOR LOW RISK PATIENT: ICD-10-CM

## 2021-10-11 DIAGNOSIS — K21.9 GASTROESOPHAGEAL REFLUX DISEASE WITHOUT ESOPHAGITIS: ICD-10-CM

## 2021-10-11 DIAGNOSIS — F33.42 RECURRENT MAJOR DEPRESSIVE DISORDER, IN FULL REMISSION (HCC): ICD-10-CM

## 2021-10-11 DIAGNOSIS — E78.2 MIXED HYPERLIPIDEMIA: ICD-10-CM

## 2021-10-11 DIAGNOSIS — J45.20 MILD INTERMITTENT ASTHMA WITHOUT COMPLICATION: ICD-10-CM

## 2021-10-11 PROCEDURE — 80307 DRUG TEST PRSMV CHEM ANLYZR: CPT

## 2021-10-11 PROCEDURE — 99214 OFFICE O/P EST MOD 30 MIN: CPT | Performed by: FAMILY MEDICINE

## 2021-10-11 RX ORDER — DEXTROAMPHETAMINE SACCHARATE, AMPHETAMINE ASPARTATE MONOHYDRATE, DEXTROAMPHETAMINE SULFATE AND AMPHETAMINE SULFATE 5; 5; 5; 5 MG/1; MG/1; MG/1; MG/1
20 CAPSULE, EXTENDED RELEASE ORAL EVERY MORNING
Qty: 30 CAPSULE | Refills: 0 | Status: SHIPPED | OUTPATIENT
Start: 2021-10-11 | End: 2021-11-10

## 2021-10-11 ASSESSMENT — ENCOUNTER SYMPTOMS
PALPITATIONS: 0
COUGH: 0
WEAKNESS: 0
NAUSEA: 0
DEPRESSION: 0
NERVOUS/ANXIOUS: 0
DIARRHEA: 0
SHORTNESS OF BREATH: 0
ABDOMINAL PAIN: 0
VOMITING: 0
DIZZINESS: 0
SORE THROAT: 0
WEIGHT LOSS: 0
DOUBLE VISION: 0
MYALGIAS: 0
BRUISES/BLEEDS EASILY: 0
BLURRED VISION: 0
CONSTIPATION: 0
CHILLS: 0
FEVER: 0
TINGLING: 0

## 2021-10-11 ASSESSMENT — FIBROSIS 4 INDEX: FIB4 SCORE: 0.6

## 2021-10-11 NOTE — ASSESSMENT & PLAN NOTE
Patient reports that she has been feeling fatigued and has been noticing that she has been sleeping in more frequently which is not her normal.  She states that she feels as though she eats a relatively healthy diet and stays somewhat active with intermittent walks throughout the week.

## 2021-10-11 NOTE — ASSESSMENT & PLAN NOTE
She reports that she currently takes Adderall 20 mg XL every a.m. to help control her ADHD type symptoms.  She was getting this prescription from her previous provider who did send her for ADHD testing.  Due to the sensitivity at the notes were not able to views the test results.  Ports that she is able to concentrate weight better when she is on the medication versus when she is not on medication.

## 2021-10-11 NOTE — ASSESSMENT & PLAN NOTE
Patient reports that she takes Protonix 40 mg as needed for her GERD symptoms.  She will use anywhere from once to twice a month especially when she uses ibuprofen as this can cause GERD symptoms.

## 2021-10-11 NOTE — ASSESSMENT & PLAN NOTE
Patient reports that she uses her albuterol approximately 3-4 times a month.  She states overall her asthma is well controlled with no nighttime awakenings.

## 2021-10-11 NOTE — ASSESSMENT & PLAN NOTE
Patient reports that she continues to take her Wellbutrin 300 mg XL daily, this has been very beneficial for her depression.  She currently denies any depression type symptoms.

## 2021-10-11 NOTE — ASSESSMENT & PLAN NOTE
Patient reports that she is supposed to be taking the Zetia 10 mg daily and rosuvastatin 10 mg daily to help control her overall cholesterol levels.  She decided to stop taking the medications that she did not want to take medications for her cholesterol levels.  She states that she relatively eats a primarily plant-based diet though she does not eat a lot of fruits and vegetables.  She does report that she walks sometimes during the week.

## 2021-10-12 NOTE — PROGRESS NOTES
Merari Paulson is a pleasant 55 y.o. female here to establish care.    HPI:   Mild intermittent asthma without complication  Patient reports that she uses her albuterol approximately 3-4 times a month.  She states overall her asthma is well controlled with no nighttime awakenings.    Gastroesophageal reflux disease without esophagitis  Patient reports that she takes Protonix 40 mg as needed for her GERD symptoms.  She will use anywhere from once to twice a month especially when she uses ibuprofen as this can cause GERD symptoms.    Recurrent major depressive disorder, in full remission (HCC)  Patient reports that she continues to take her Wellbutrin 300 mg XL daily, this has been very beneficial for her depression.  She currently denies any depression type symptoms.    Mixed hyperlipidemia  Patient reports that she is supposed to be taking the Zetia 10 mg daily and rosuvastatin 10 mg daily to help control her overall cholesterol levels.  She decided to stop taking the medications that she did not want to take medications for her cholesterol levels.  She states that she relatively eats a primarily plant-based diet though she does not eat a lot of fruits and vegetables.  She does report that she walks sometimes during the week.    Attention deficit hyperactivity disorder (ADHD), inattentive type, mild  She reports that she currently takes Adderall 20 mg XL every a.m. to help control her ADHD type symptoms.  She was getting this prescription from her previous provider who did send her for ADHD testing.  Due to the sensitivity at the notes were not able to views the test results.  Ports that she is able to concentrate weight better when she is on the medication versus when she is not on medication.    Fatigue  Patient reports that she has been feeling fatigued and has been noticing that she has been sleeping in more frequently which is not her normal.  She states that she feels as though she eats a relatively  healthy diet and stays somewhat active with intermittent walks throughout the week.      Health Maintenance  Immunization: Patient is due for her influenza vaccine.  Patient is due for hepatitis C screening.  Patient is due for her mammogram.    Current medicines (including changes today)  Current Outpatient Medications   Medication Sig Dispense Refill   • MILK THISTLE PO Take  by mouth.     • CALCIUM-MAGNESIUM-ZINC PO Take  by mouth.     • Probiotic Product (PROBIOTIC PO) Take  by mouth.     • triamcinolone acetonide (KENALOG) 0.1 % Cream      • pantoprazole (PROTONIX) 40 MG Tablet Delayed Response Take 1 tablet by mouth as needed. 90 tablet 1   • amphetamine-dextroamphetamine XR (ADDERALL XR) 20 MG per XR capsule Take 1 capsule by mouth every morning for 30 days. 30 capsule 0   • citalopram (CELEXA) 40 MG Tab Take 1 tablet by mouth every day. 90 tablet 3   • buPROPion (WELLBUTRIN XL) 300 MG XL tablet Take 1 tablet by mouth every morning. 90 tablet 3   • fluticasone (FLONASE) 50 MCG/ACT nasal spray Administer 1 Spray into affected nostril(S) every day. 16 g 1   • albuterol 108 (90 Base) MCG/ACT Aero Soln inhalation aerosol        No current facility-administered medications for this visit.       Past Medical/ Surgical History  She  has a past medical history of ADHD, Anxiety, Depression, and Hyperlipidemia.  She  has a past surgical history that includes dental extraction(s) (2017).    Social History  Social History     Tobacco Use   • Smoking status: Former Smoker   • Smokeless tobacco: Never Used   • Tobacco comment: 10 years, 1 pack daily, quit at 20 years old   Vaping Use   • Vaping Use: Never used   Substance Use Topics   • Alcohol use: Yes     Alcohol/week: 1.2 oz     Types: 2 Glasses of wine per week     Comment: occ    • Drug use: Never     Social History     Social History Narrative   • Not on file        Family History  Family History   Problem Relation Age of Onset   • Hypertension Mother      Family  "Status   Relation Name Status   • MGMo     • Mo     • MGFa           Review of Systems   Constitutional: Negative for chills, fever, malaise/fatigue and weight loss.   HENT: Negative for hearing loss and sore throat.    Eyes: Negative for blurred vision and double vision.   Respiratory: Negative for cough and shortness of breath.    Cardiovascular: Negative for chest pain, palpitations and leg swelling.   Gastrointestinal: Negative for abdominal pain, constipation, diarrhea, nausea and vomiting.   Musculoskeletal: Negative for myalgias.   Skin: Negative for rash.   Neurological: Negative for dizziness, tingling and weakness.   Endo/Heme/Allergies: Does not bruise/bleed easily.   Psychiatric/Behavioral: Negative for depression. The patient is not nervous/anxious.          Objective:     /78 (BP Location: Left arm, Patient Position: Sitting, BP Cuff Size: Adult)   Pulse 83   Temp 36.9 °C (98.4 °F) (Temporal)   Ht 1.6 m (5' 3\")   Wt 73.2 kg (161 lb 6 oz)   SpO2 94%  Body mass index is 28.59 kg/m².    Physical Exam  Constitutional:       General: She is not in acute distress.  HENT:      Head: Normocephalic and atraumatic.      Right Ear: External ear normal.      Left Ear: External ear normal.   Eyes:      Conjunctiva/sclera: Conjunctivae normal.      Pupils: Pupils are equal, round, and reactive to light.   Cardiovascular:      Rate and Rhythm: Normal rate and regular rhythm.      Heart sounds: No murmur heard.   No friction rub. No gallop.    Pulmonary:      Effort: Pulmonary effort is normal.      Breath sounds: Normal breath sounds. No wheezing or rales.   Abdominal:      General: Bowel sounds are normal.      Palpations: Abdomen is soft.      Tenderness: There is no abdominal tenderness.   Musculoskeletal:      Cervical back: Normal range of motion and neck supple.   Skin:     General: Skin is warm and dry.      Findings: No rash.   Neurological:      Mental Status: She is alert " and oriented to person, place, and time.      Gait: Gait is intact.   Psychiatric:         Mood and Affect: Affect normal.        Imaging:  No recent imaging to review    Labs:  No recent imaging to review  Assessment and Plan:   The following treatment plan was discussed    1. Attention deficit hyperactivity disorder (ADHD), inattentive type, mild  Chronic, stable. I discussed with the patient about prescription medications for Adderall. A controlled substance agreement form was completed by the patient as well as a urine drug screen. I did inform the patient that we will go ahead and send in a 30-day supply based on the urine drug screen results as well as when she requests a refill will determine the quantity that the medication is provided for the patient.  - Controlled Substance Treatment Agreement  - URINE DRUG SCREEN; Future  - amphetamine-dextroamphetamine (ADDERALL XR) 20 MG per XR capsule; Take 1 Capsule by mouth every morning for 30 days.  Dispense: 30 Capsule; Refill: 0    2. Mixed hyperlipidemia  Chronic, unstable. I discussed with the patient but the importance of controlling her cholesterol levels and that medication can be very beneficial due to her extremely high levels. I discussed with the patient about risks for heart attack or stroke if her cholesterol levels are left untreated. She did acknowledge these risks.  - Lipid Profile; Future    3. Fatigue, unspecified type  I discussed with the patient about fatigue and that sedentary lifestyle and poor diet the most common reasons for her fatigue symptoms. We will check a thyroid, vitamin D level, CBC, CMP to rule out possible other etiologies.  - CBC WITH DIFFERENTIAL; Future  - Comp Metabolic Panel; Future  - ESTIMATED GFR; Future  - VITAMIN D,25 HYDROXY; Future  - TSH WITH REFLEX TO FT4; Future    4. Mild intermittent asthma without complication  Chronic, well controlled. I recommended that the patient continue to use albuterol as needed for any  shortness of breath type symptoms. Informed the patient to notify me when she needs a refill.    5. Gastroesophageal reflux disease without esophagitis  Chronic, controlled. I recommended that the patient come off the Protonix and switch over to the Prilosec 20 mg to help her with her GERD type symptoms.    6. Recurrent major depressive disorder, in full remission (HCC)  Chronic, well controlled. I recommended that the patient continue with her current medication regime and is helping her overall depression symptoms.    7. Encounter for hepatitis C screening test for low risk patient  - HEP C VIRUS ANTIBODY; Future    8. Encounter for screening mammogram for malignant neoplasm of breast  - MA-SCREENING MAMMO BILAT W/TOMOSYNTHESIS W/CAD; Future      Records requested.  Followup: Return in about 3 months (around 1/11/2022).    I have placed urine drug screen orders.  The MA is preforming urine drug screen orders under the direction of Dr. Winkler    Please note that this dictation was created using voice recognition software. I have made every reasonable attempt to correct obvious errors, but I expect that there are errors of grammar and possibly content that I did not discover before finalizing the note.

## 2021-10-13 LAB
AMPHETAMINES UR QL: POSITIVE NG/ML
BARBITURATES UR QL: NEGATIVE NG/ML
BENZODIAZ UR QL: NEGATIVE NG/ML
CANNABINOIDS UR QL SCN: NEGATIVE NG/ML
COCAINE UR QL: NEGATIVE NG/ML
DRUG SCREEN COMMENT UR-IMP: NORMAL
MDMA CTO UR SCN-MCNC: POSITIVE NG/ML
METHADONE UR QL: NEGATIVE NG/ML
OPIATES UR QL: NEGATIVE NG/ML
OXYCODONE CTO UR SCN-MCNC: NEGATIVE NG/ML
PCP UR QL SCN: NEGATIVE NG/ML
PROPOXYPH UR QL: NEGATIVE NG/ML

## 2021-11-30 ENCOUNTER — HOSPITAL ENCOUNTER (OUTPATIENT)
Dept: RADIOLOGY | Facility: MEDICAL CENTER | Age: 56
End: 2021-11-30
Payer: COMMERCIAL

## 2021-12-22 ENCOUNTER — APPOINTMENT (RX ONLY)
Dept: URBAN - METROPOLITAN AREA CLINIC 4 | Facility: CLINIC | Age: 56
Setting detail: DERMATOLOGY
End: 2021-12-22

## 2021-12-22 DIAGNOSIS — L29.89 OTHER PRURITUS: ICD-10-CM | Status: IMPROVED

## 2021-12-22 DIAGNOSIS — L29.8 OTHER PRURITUS: ICD-10-CM | Status: IMPROVED

## 2021-12-22 DIAGNOSIS — L50.3 DERMATOGRAPHIC URTICARIA: ICD-10-CM

## 2021-12-22 PROCEDURE — 99213 OFFICE O/P EST LOW 20 MIN: CPT

## 2021-12-22 PROCEDURE — ? DIAGNOSIS COMMENT

## 2021-12-22 PROCEDURE — ? COUNSELING

## 2021-12-22 ASSESSMENT — LOCATION DETAILED DESCRIPTION DERM
LOCATION DETAILED: RIGHT ANTERIOR DISTAL UPPER ARM
LOCATION DETAILED: LEFT ANTERIOR PROXIMAL UPPER ARM

## 2021-12-22 ASSESSMENT — LOCATION SIMPLE DESCRIPTION DERM
LOCATION SIMPLE: RIGHT UPPER ARM
LOCATION SIMPLE: LEFT UPPER ARM

## 2021-12-22 ASSESSMENT — LOCATION ZONE DERM: LOCATION ZONE: ARM

## 2021-12-22 NOTE — PROCEDURE: DIAGNOSIS COMMENT
Render Risk Assessment In Note?: no
Comment: 12/22 Rash has improved since last visit in the summer. I advised her to avoid the sun during summer time and continue triamcinolone Rx as directed. We discussed physical therapy. She was a hairdresser for years.\\n\\nPrior: Patient has “itching spells” brought on by stress that leaves scars. Patient counseled in using sunscreen regularly and Rx of 0.1% triamcinolone cream bid for 1 prn for flares. Has not tried using ice to help. Pt states symptoms do change with the seasons. Dermatographism test is positive.
Detail Level: Simple
Comment: She takes Claritin daily for rash, I advised her to continue Claritin OTC. She had a flare up last night and I advised continue use of Claritin.

## 2022-01-26 ENCOUNTER — APPOINTMENT (OUTPATIENT)
Dept: RADIOLOGY | Facility: MEDICAL CENTER | Age: 57
End: 2022-01-26
Attending: FAMILY MEDICINE
Payer: COMMERCIAL

## 2022-02-10 ENCOUNTER — OFFICE VISIT (OUTPATIENT)
Dept: MEDICAL GROUP | Facility: MEDICAL CENTER | Age: 57
End: 2022-02-10
Payer: COMMERCIAL

## 2022-02-10 VITALS
WEIGHT: 165.8 LBS | DIASTOLIC BLOOD PRESSURE: 68 MMHG | SYSTOLIC BLOOD PRESSURE: 122 MMHG | HEIGHT: 63 IN | OXYGEN SATURATION: 95 % | BODY MASS INDEX: 29.38 KG/M2 | HEART RATE: 98 BPM | TEMPERATURE: 98.1 F

## 2022-02-10 DIAGNOSIS — F41.1 GENERALIZED ANXIETY DISORDER: ICD-10-CM

## 2022-02-10 DIAGNOSIS — F33.0 MILD EPISODE OF RECURRENT MAJOR DEPRESSIVE DISORDER (HCC): ICD-10-CM

## 2022-02-10 DIAGNOSIS — F90.0 ATTENTION DEFICIT HYPERACTIVITY DISORDER (ADHD), INATTENTIVE TYPE, MILD: ICD-10-CM

## 2022-02-10 DIAGNOSIS — H57.9 ITCHY EYES: ICD-10-CM

## 2022-02-10 PROBLEM — F33.9 EPISODE OF RECURRENT MAJOR DEPRESSIVE DISORDER (HCC): Status: ACTIVE | Noted: 2021-03-26

## 2022-02-10 PROCEDURE — 99214 OFFICE O/P EST MOD 30 MIN: CPT | Performed by: FAMILY MEDICINE

## 2022-02-10 RX ORDER — DEXTROAMPHETAMINE SACCHARATE, AMPHETAMINE ASPARTATE MONOHYDRATE, DEXTROAMPHETAMINE SULFATE AND AMPHETAMINE SULFATE 5; 5; 5; 5 MG/1; MG/1; MG/1; MG/1
20 CAPSULE, EXTENDED RELEASE ORAL EVERY MORNING
Qty: 30 CAPSULE | Refills: 0 | Status: SHIPPED | OUTPATIENT
Start: 2022-03-12 | End: 2022-04-11

## 2022-02-10 RX ORDER — DEXTROAMPHETAMINE SACCHARATE, AMPHETAMINE ASPARTATE MONOHYDRATE, DEXTROAMPHETAMINE SULFATE AND AMPHETAMINE SULFATE 5; 5; 5; 5 MG/1; MG/1; MG/1; MG/1
20 CAPSULE, EXTENDED RELEASE ORAL EVERY MORNING
Qty: 30 CAPSULE | Refills: 0 | Status: SHIPPED | OUTPATIENT
Start: 2022-02-10 | End: 2022-02-17 | Stop reason: SDUPTHER

## 2022-02-10 RX ORDER — DEXTROAMPHETAMINE SACCHARATE, AMPHETAMINE ASPARTATE MONOHYDRATE, DEXTROAMPHETAMINE SULFATE AND AMPHETAMINE SULFATE 5; 5; 5; 5 MG/1; MG/1; MG/1; MG/1
CAPSULE, EXTENDED RELEASE ORAL
COMMUNITY
Start: 2021-11-17 | End: 2022-02-10 | Stop reason: SDUPTHER

## 2022-02-10 RX ORDER — BUPROPION HYDROCHLORIDE 300 MG/1
300 TABLET ORAL EVERY MORNING
Qty: 90 TABLET | Refills: 3 | Status: SHIPPED | OUTPATIENT
Start: 2022-02-10

## 2022-02-10 RX ORDER — DEXTROAMPHETAMINE SACCHARATE, AMPHETAMINE ASPARTATE MONOHYDRATE, DEXTROAMPHETAMINE SULFATE AND AMPHETAMINE SULFATE 5; 5; 5; 5 MG/1; MG/1; MG/1; MG/1
20 CAPSULE, EXTENDED RELEASE ORAL EVERY MORNING
Qty: 30 CAPSULE | Refills: 0 | Status: SHIPPED | OUTPATIENT
Start: 2022-04-11 | End: 2022-05-10 | Stop reason: SDUPTHER

## 2022-02-10 RX ORDER — CITALOPRAM 40 MG/1
40 TABLET ORAL DAILY
Qty: 90 TABLET | Refills: 3 | Status: SHIPPED | OUTPATIENT
Start: 2022-02-10 | End: 2022-09-16 | Stop reason: SDUPTHER

## 2022-02-10 ASSESSMENT — ENCOUNTER SYMPTOMS
INSOMNIA: 1
ABDOMINAL PAIN: 0
DIZZINESS: 0
NERVOUS/ANXIOUS: 1
FEVER: 0
WEAKNESS: 0
PALPITATIONS: 0
SHORTNESS OF BREATH: 0
WEIGHT LOSS: 0
CHILLS: 0
DEPRESSION: 1
MYALGIAS: 0
COUGH: 0

## 2022-02-10 ASSESSMENT — PATIENT HEALTH QUESTIONNAIRE - PHQ9
SUM OF ALL RESPONSES TO PHQ QUESTIONS 1-9: 7
CLINICAL INTERPRETATION OF PHQ2 SCORE: 1
5. POOR APPETITE OR OVEREATING: 1 - SEVERAL DAYS

## 2022-02-10 ASSESSMENT — FIBROSIS 4 INDEX: FIB4 SCORE: 0.61

## 2022-02-10 NOTE — ASSESSMENT & PLAN NOTE
Over the last few months has been noticing an increase in itchy eyes, bilaterally.  Feels that this may be related to allergies.  Like an allergy eyedrop prescription strength that she can use to help with her symptoms.  Does take a daily antihistamine, saline drops, and Visine antihistamine relief.  Not have an ophthalmologist she follows.

## 2022-02-10 NOTE — ASSESSMENT & PLAN NOTE
Reports that she has been feeling a little bit more down more so than her normal due to marital difficulties.  Continues to used to take the Wellbutrin 300 mg XL and Celexa 40 mg daily.  Is interested in following up with psychiatry to help with her increase in depression.

## 2022-02-10 NOTE — PROGRESS NOTES
Merari Paulson is a pleasant 56 y.o. female here for   Chief Complaint   Patient presents with   • Medication Refill      HPI:   Attention deficit hyperactivity disorder (ADHD), inattentive type, mild  Reports that Adderall 20 mg XL has been very beneficial for her allowing her to maintain focus while she is working.  Would like a refill of this medication.    Mild episode of recurrent major depressive disorder (HCC)  Reports that she has been feeling a little bit more down more so than her normal due to marital difficulties.  Continues to used to take the Wellbutrin 300 mg XL and Celexa 40 mg daily.  Is interested in following up with psychiatry to help with her increase in depression.    Itchy eyes  Over the last few months has been noticing an increase in itchy eyes, bilaterally.  Feels that this may be related to allergies.  Like an allergy eyedrop prescription strength that she can use to help with her symptoms.  Does take a daily antihistamine, saline drops, and Visine antihistamine relief.  Not have an ophthalmologist she follows.      Health Maintenance  Immunization: Patient is due for her shingles vaccine series.  Hepatitis C screening: Patient is due, orders placed  Mammogram: Patient is due, order was placed    Current Medicines (including changes today)  Current Outpatient Medications   Medication Sig Dispense Refill   • amphetamine-dextroamphetamine (ADDERALL XR) 20 MG per XR capsule Take 1 Capsule by mouth every morning for 30 days. 30 Capsule 0   • [START ON 3/12/2022] amphetamine-dextroamphetamine (ADDERALL XR, 20MG,) 20 MG per XR capsule Take 1 Capsule by mouth every morning for 30 days. 30 Capsule 0   • [START ON 4/11/2022] amphetamine-dextroamphetamine (ADDERALL XR, 20MG,) 20 MG per XR capsule Take 1 Capsule by mouth every morning for 30 days. 30 Capsule 0   • buPROPion (WELLBUTRIN XL) 300 MG XL tablet Take 1 Tablet by mouth every morning. 90 Tablet 3   • citalopram (CELEXA) 40 MG Tab Take 1  "Tablet by mouth every day. 90 Tablet 3   • fluticasone (FLONASE) 50 MCG/ACT nasal spray Administer 1 Spray into affected nostril(S) every day. 16 g 1   • albuterol 108 (90 Base) MCG/ACT Aero Soln inhalation aerosol      • MILK THISTLE PO Take  by mouth.     • CALCIUM-MAGNESIUM-ZINC PO Take  by mouth.     • Probiotic Product (PROBIOTIC PO) Take  by mouth.     • triamcinolone acetonide (KENALOG) 0.1 % Cream      • pantoprazole (PROTONIX) 40 MG Tablet Delayed Response Take 1 tablet by mouth as needed. 90 tablet 1     No current facility-administered medications for this visit.     Past Medical/ Surgical History  She  has a past medical history of ADHD, Anxiety, Depression, and Hyperlipidemia.  She  has a past surgical history that includes dental extraction(s) (2017).    Review of Systems   Constitutional: Negative for chills, fever, malaise/fatigue and weight loss.   Respiratory: Negative for cough and shortness of breath.    Cardiovascular: Negative for chest pain and palpitations.   Gastrointestinal: Negative for abdominal pain.   Genitourinary: Negative.    Musculoskeletal: Negative for myalgias.   Skin: Negative for rash.   Neurological: Negative for dizziness and weakness.   Psychiatric/Behavioral: Positive for depression. The patient is nervous/anxious and has insomnia.          Objective:     /68 (BP Location: Left arm, Patient Position: Sitting, BP Cuff Size: Adult)   Pulse 98   Temp 36.7 °C (98.1 °F) (Temporal)   Ht 1.6 m (5' 3\")   Wt 75.2 kg (165 lb 12.8 oz)   SpO2 95%  Body mass index is 29.37 kg/m².    Physical Exam  Constitutional:       General: She is not in acute distress.  HENT:      Head: Normocephalic and atraumatic.   Eyes:      Conjunctiva/sclera: Conjunctivae normal.      Pupils: Pupils are equal, round, and reactive to light.   Pulmonary:      Effort: Pulmonary effort is normal. No respiratory distress.   Abdominal:      General: There is no distension.   Musculoskeletal:      " Cervical back: Normal range of motion and neck supple.   Skin:     General: Skin is warm and dry.      Findings: No rash.   Neurological:      Mental Status: She is alert and oriented to person, place, and time.      Gait: Gait is intact.   Psychiatric:         Mood and Affect: Affect normal.         Speech: Speech normal.         Behavior: Behavior normal.        Imaging:  No recent imaging to review    Labs  No recent labs to review  Assessment and Plan:   The following treatment plan was discussed    1. Attention deficit hyperactivity disorder (ADHD), inattentive type, mild  Chronic, stable.  I recommended that we continue with her 20 mg a day for her hyperactivity.  Prescriptions were sent to her preferred pharmacy to cover her for the next 3 months.  - amphetamine-dextroamphetamine (ADDERALL XR) 20 MG per XR capsule; Take 1 Capsule by mouth every morning for 30 days.  Dispense: 30 Capsule; Refill: 0  - amphetamine-dextroamphetamine (ADDERALL XR, 20MG,) 20 MG per XR capsule; Take 1 Capsule by mouth every morning for 30 days.  Dispense: 30 Capsule; Refill: 0  - amphetamine-dextroamphetamine (ADDERALL XR, 20MG,) 20 MG per XR capsule; Take 1 Capsule by mouth every morning for 30 days.  Dispense: 30 Capsule; Refill: 0    2. Generalized anxiety disorder  3. Mild episode of recurrent major depressive disorder (HCC)  Chronic, unstable.  I discussed with the patient about the importance of continuing her Wellbutrin and Celexa as previously prescribed.  I also recommended that she follow-up with psychology, referral placed for the patient.  - buPROPion (WELLBUTRIN XL) 300 MG XL tablet; Take 1 Tablet by mouth every morning.  Dispense: 90 Tablet; Refill: 3  - citalopram (CELEXA) 40 MG Tab; Take 1 Tablet by mouth every day.  Dispense: 90 Tablet; Refill: 3  - Referral to Psychology    4. Itchy eyes  Acute.  I discussed with the patient that I recommended that she continue with the saline eyedrops to help flush any irritants  in her eye.  I recommended that she continue with a daily antihistamine but may need to consider switching the medication to another brand such as Allegra, Zyrtec, Claritin.  I did recommend that she use over-the-counter Vaseline antihistamine eyedrops to help with any continued allergies.  I also recommended that she runs an air purifier in her home to help alleviate the allergen.  I did place a referral for ophthalmology to further manage her symptoms.  - Referral to Ophthalmology      Followup: Return in about 3 months (around 5/10/2022), or if symptoms worsen or fail to improve, for Medication refill.      Please note that this dictation was created using voice recognition software. I have made every reasonable attempt to correct obvious errors, but I expect that there are errors of grammar and possibly content that I did not discover before finalizing the note.

## 2022-02-10 NOTE — ASSESSMENT & PLAN NOTE
Reports that Adderall 20 mg XL has been very beneficial for her allowing her to maintain focus while she is working.  Would like a refill of this medication.

## 2022-02-17 ENCOUNTER — PATIENT MESSAGE (OUTPATIENT)
Dept: MEDICAL GROUP | Facility: MEDICAL CENTER | Age: 57
End: 2022-02-17
Payer: COMMERCIAL

## 2022-02-17 DIAGNOSIS — F90.0 ATTENTION DEFICIT HYPERACTIVITY DISORDER (ADHD), INATTENTIVE TYPE, MILD: ICD-10-CM

## 2022-02-17 RX ORDER — DEXTROAMPHETAMINE SACCHARATE, AMPHETAMINE ASPARTATE MONOHYDRATE, DEXTROAMPHETAMINE SULFATE AND AMPHETAMINE SULFATE 5; 5; 5; 5 MG/1; MG/1; MG/1; MG/1
20 CAPSULE, EXTENDED RELEASE ORAL EVERY MORNING
Qty: 30 CAPSULE | Refills: 0 | Status: SHIPPED | OUTPATIENT
Start: 2022-02-17 | End: 2022-03-19

## 2022-02-17 NOTE — TELEPHONE ENCOUNTER
From: Merari Paulson  To: Nurse Practitioner Regina Cary  Sent: 2/17/2022 1:09 PM PST  Subject: Adderall refill     Hi so Pal just informed me that they are on back order until March so they said to have you call it in to a different pharmacy so could you please call it into [facility] on [address] please thank you

## 2022-03-23 ENCOUNTER — HOSPITAL ENCOUNTER (OUTPATIENT)
Dept: RADIOLOGY | Facility: MEDICAL CENTER | Age: 57
End: 2022-03-23
Attending: FAMILY MEDICINE
Payer: COMMERCIAL

## 2022-03-23 ENCOUNTER — APPOINTMENT (RX ONLY)
Dept: URBAN - METROPOLITAN AREA CLINIC 4 | Facility: CLINIC | Age: 57
Setting detail: DERMATOLOGY
End: 2022-03-23

## 2022-03-23 DIAGNOSIS — L29.8 OTHER PRURITUS: ICD-10-CM

## 2022-03-23 DIAGNOSIS — L29.89 OTHER PRURITUS: ICD-10-CM

## 2022-03-23 DIAGNOSIS — Z12.31 ENCOUNTER FOR SCREENING MAMMOGRAM FOR MALIGNANT NEOPLASM OF BREAST: ICD-10-CM

## 2022-03-23 PROCEDURE — 99213 OFFICE O/P EST LOW 20 MIN: CPT

## 2022-03-23 PROCEDURE — ? DIAGNOSIS COMMENT

## 2022-03-23 PROCEDURE — 77063 BREAST TOMOSYNTHESIS BI: CPT

## 2022-03-23 PROCEDURE — ? COUNSELING

## 2022-03-23 PROCEDURE — ? PRESCRIPTION

## 2022-03-23 RX ORDER — TRIAMCINOLONE ACETONIDE 1 MG/G
CREAM TOPICAL BID
Qty: 80 | Refills: 1 | Status: ERX

## 2022-03-23 ASSESSMENT — LOCATION SIMPLE DESCRIPTION DERM
LOCATION SIMPLE: RIGHT UPPER ARM
LOCATION SIMPLE: LEFT UPPER ARM

## 2022-03-23 ASSESSMENT — LOCATION ZONE DERM: LOCATION ZONE: ARM

## 2022-03-23 ASSESSMENT — LOCATION DETAILED DESCRIPTION DERM
LOCATION DETAILED: LEFT ANTERIOR PROXIMAL UPPER ARM
LOCATION DETAILED: RIGHT ANTERIOR DISTAL UPPER ARM

## 2022-03-23 NOTE — PROCEDURE: COUNSELING
Detail Level: Zone
Detail Level: Detailed
Patient Specific Counseling (Will Not Stick From Patient To Patient): Recommended following up with a primary Dr for further treatment of neck pain or with established chiropractor

## 2022-03-23 NOTE — PROCEDURE: DIAGNOSIS COMMENT
Render Risk Assessment In Note?: no
Comment: 3/23/22 Patient states rash has improved on left arm completely with mild scabbing on the right upper arm. Patient used triamcinolone cream bid prn for flare ups. Patient does have underlying neck tension, and soreness.\\n\\n\\n12/22 Rash has improved since last visit in the summer. I advised her to avoid the sun during summer time and continue triamcinolone Rx as directed. We discussed physical therapy. She was a hairdresser for years.\\n\\nPrior: Patient has “itching spells” brought on by stress that leaves scars. Patient counseled in using sunscreen regularly and Rx of 0.1% triamcinolone cream bid for 1 prn for flares. Has not tried using ice to help. Pt states symptoms do change with the seasons. Dermatographism test is positive.
Detail Level: Simple

## 2022-05-10 ENCOUNTER — OFFICE VISIT (OUTPATIENT)
Dept: MEDICAL GROUP | Facility: MEDICAL CENTER | Age: 57
End: 2022-05-10
Payer: COMMERCIAL

## 2022-05-10 VITALS
TEMPERATURE: 97.7 F | RESPIRATION RATE: 16 BRPM | DIASTOLIC BLOOD PRESSURE: 78 MMHG | HEART RATE: 73 BPM | SYSTOLIC BLOOD PRESSURE: 138 MMHG | WEIGHT: 162 LBS | HEIGHT: 64 IN | BODY MASS INDEX: 27.66 KG/M2 | OXYGEN SATURATION: 95 %

## 2022-05-10 DIAGNOSIS — F90.0 ATTENTION DEFICIT HYPERACTIVITY DISORDER (ADHD), INATTENTIVE TYPE, MILD: ICD-10-CM

## 2022-05-10 DIAGNOSIS — J45.20 MILD INTERMITTENT ASTHMA WITHOUT COMPLICATION: ICD-10-CM

## 2022-05-10 DIAGNOSIS — F33.0 MILD EPISODE OF RECURRENT MAJOR DEPRESSIVE DISORDER (HCC): ICD-10-CM

## 2022-05-10 DIAGNOSIS — L29.9 BRACHIORADIAL PRURITUS: ICD-10-CM

## 2022-05-10 DIAGNOSIS — F41.1 GENERALIZED ANXIETY DISORDER: ICD-10-CM

## 2022-05-10 PROCEDURE — 99214 OFFICE O/P EST MOD 30 MIN: CPT | Performed by: FAMILY MEDICINE

## 2022-05-10 RX ORDER — DEXTROAMPHETAMINE SACCHARATE, AMPHETAMINE ASPARTATE MONOHYDRATE, DEXTROAMPHETAMINE SULFATE AND AMPHETAMINE SULFATE 5; 5; 5; 5 MG/1; MG/1; MG/1; MG/1
20 CAPSULE, EXTENDED RELEASE ORAL EVERY MORNING
Qty: 30 CAPSULE | Refills: 0 | Status: SHIPPED | OUTPATIENT
Start: 2022-05-10 | End: 2022-06-09

## 2022-05-10 RX ORDER — DEXTROAMPHETAMINE SACCHARATE, AMPHETAMINE ASPARTATE MONOHYDRATE, DEXTROAMPHETAMINE SULFATE AND AMPHETAMINE SULFATE 5; 5; 5; 5 MG/1; MG/1; MG/1; MG/1
20 CAPSULE, EXTENDED RELEASE ORAL EVERY MORNING
Qty: 30 CAPSULE | Refills: 0 | Status: SHIPPED | OUTPATIENT
Start: 2022-07-09 | End: 2022-08-08

## 2022-05-10 RX ORDER — DEXTROAMPHETAMINE SACCHARATE, AMPHETAMINE ASPARTATE MONOHYDRATE, DEXTROAMPHETAMINE SULFATE AND AMPHETAMINE SULFATE 5; 5; 5; 5 MG/1; MG/1; MG/1; MG/1
20 CAPSULE, EXTENDED RELEASE ORAL EVERY MORNING
Qty: 30 CAPSULE | Refills: 0 | Status: SHIPPED
Start: 2022-07-09 | End: 2022-05-10

## 2022-05-10 RX ORDER — DEXTROAMPHETAMINE SACCHARATE, AMPHETAMINE ASPARTATE MONOHYDRATE, DEXTROAMPHETAMINE SULFATE AND AMPHETAMINE SULFATE 5; 5; 5; 5 MG/1; MG/1; MG/1; MG/1
20 CAPSULE, EXTENDED RELEASE ORAL EVERY MORNING
Qty: 30 CAPSULE | Refills: 0 | Status: SHIPPED
Start: 2022-05-10 | End: 2022-05-10

## 2022-05-10 RX ORDER — DEXTROAMPHETAMINE SACCHARATE, AMPHETAMINE ASPARTATE MONOHYDRATE, DEXTROAMPHETAMINE SULFATE AND AMPHETAMINE SULFATE 5; 5; 5; 5 MG/1; MG/1; MG/1; MG/1
20 CAPSULE, EXTENDED RELEASE ORAL EVERY MORNING
Qty: 30 CAPSULE | Refills: 0 | Status: SHIPPED
Start: 2022-06-09 | End: 2022-05-10

## 2022-05-10 RX ORDER — DEXTROAMPHETAMINE SACCHARATE, AMPHETAMINE ASPARTATE MONOHYDRATE, DEXTROAMPHETAMINE SULFATE AND AMPHETAMINE SULFATE 5; 5; 5; 5 MG/1; MG/1; MG/1; MG/1
20 CAPSULE, EXTENDED RELEASE ORAL EVERY MORNING
Qty: 30 CAPSULE | Refills: 0 | Status: SHIPPED | OUTPATIENT
Start: 2022-06-09 | End: 2022-07-09

## 2022-05-10 ASSESSMENT — ENCOUNTER SYMPTOMS
WEIGHT LOSS: 0
PALPITATIONS: 0
COUGH: 0
WEAKNESS: 0
DIZZINESS: 0
FEVER: 0
SHORTNESS OF BREATH: 1
CHILLS: 0
MYALGIAS: 0
ABDOMINAL PAIN: 0
DEPRESSION: 1
NERVOUS/ANXIOUS: 1
WHEEZING: 1

## 2022-05-10 ASSESSMENT — FIBROSIS 4 INDEX: FIB4 SCORE: 0.61

## 2022-05-10 NOTE — PROGRESS NOTES
Merari Paulson is a pleasant 56 y.o. female here for   Chief Complaint   Patient presents with   • Medication Refill     Adderall   • Referral Needed     PT for Brachioradial Pruritus      HPI:   Problem   Brachioradial Pruritus    Was having continuous itching to bilateral forearms.  Was seen by her dermatologist who diagnosed her with brachial radial pruritus.  Advised to follow-up with physical therapy by her dermatologist.     Attention Deficit Hyperactivity Disorder (Adhd), Inattentive Type, Mild    Adderall 20 mg XL every a.m. help with her ADHD symptoms.  Here today for a refill of this medication.     Mild Intermittent Asthma Without Complication    Continues to use albuterol as needed.  Using her inhaler more then usual due to allergy season.  Has singular at home, but has not started to use this medication.      Generalized Anxiety Disorder    Currently taking Celexa 40 mg daily to help with her anxiety levels.  Was having an increase in her anxiety levels, referral was placed to psychiatry.  Referral was just processed, plans on going up psychiatry.  Does feel that her anxiety is much more stable.     Mild Episode of Recurrent Major Depressive Disorder (Hcc)    Continues to take Wellbutrin 300 mg XL daily, Celexa 40 mg daily  Feeling that her depression is stable, referral to psychiatry just went through  Does plan on calling to schedule appointment with psychiatry.          Health Maintenance  Immunization: Due for tetanus, shingles.  Hep C: Patient is due    Current Medicines (including changes today)  Current Outpatient Medications   Medication Sig Dispense Refill   • [START ON 7/9/2022] amphetamine-dextroamphetamine (ADDERALL XR, 20MG,) 20 MG per XR capsule Take 1 Capsule by mouth every morning for 30 days. 30 Capsule 0   • [START ON 6/9/2022] amphetamine-dextroamphetamine (ADDERALL XR, 20MG,) 20 MG per XR capsule Take 1 Capsule by mouth every morning for 30 days. 30 Capsule 0   •  "amphetamine-dextroamphetamine (ADDERALL XR, 20MG,) 20 MG per XR capsule Take 1 Capsule by mouth every morning for 30 days. 30 Capsule 0   • buPROPion (WELLBUTRIN XL) 300 MG XL tablet Take 1 Tablet by mouth every morning. 90 Tablet 3   • citalopram (CELEXA) 40 MG Tab Take 1 Tablet by mouth every day. 90 Tablet 3   • MILK THISTLE PO Take  by mouth.     • CALCIUM-MAGNESIUM-ZINC PO Take  by mouth.     • Probiotic Product (PROBIOTIC PO) Take  by mouth.     • triamcinolone acetonide (KENALOG) 0.1 % Cream      • pantoprazole (PROTONIX) 40 MG Tablet Delayed Response Take 1 tablet by mouth as needed. 90 tablet 1   • fluticasone (FLONASE) 50 MCG/ACT nasal spray Administer 1 Spray into affected nostril(S) every day. 16 g 1   • albuterol 108 (90 Base) MCG/ACT Aero Soln inhalation aerosol        No current facility-administered medications for this visit.     Past Medical/ Surgical History  She  has a past medical history of ADHD, Anxiety, Depression, and Hyperlipidemia.  She  has a past surgical history that includes dental extraction(s) (2017).    Review of Systems   Constitutional: Negative for chills, fever, malaise/fatigue and weight loss.   Respiratory: Positive for shortness of breath (Secondary to allergies) and wheezing (Secondary to allergies). Negative for cough.    Cardiovascular: Negative for chest pain and palpitations.   Gastrointestinal: Negative for abdominal pain.   Genitourinary: Negative.    Musculoskeletal: Negative for myalgias.   Skin: Negative for rash.   Neurological: Negative for dizziness and weakness.   Psychiatric/Behavioral: Positive for depression (Stable). The patient is nervous/anxious (Stable).          Objective:     /78 (BP Location: Right arm, Patient Position: Sitting, BP Cuff Size: Adult)   Pulse 73   Temp 36.5 °C (97.7 °F) (Temporal)   Resp 16   Ht 1.626 m (5' 4\")   Wt 73.5 kg (162 lb)   SpO2 95%  Body mass index is 27.81 kg/m².    Physical Exam  Constitutional:       General: " She is not in acute distress.  HENT:      Head: Normocephalic and atraumatic.   Eyes:      Conjunctiva/sclera: Conjunctivae normal.      Pupils: Pupils are equal, round, and reactive to light.   Pulmonary:      Effort: Pulmonary effort is normal. No respiratory distress.   Abdominal:      General: There is no distension.   Musculoskeletal:      Cervical back: Normal range of motion and neck supple.   Skin:     General: Skin is warm and dry.      Findings: No rash.   Neurological:      Mental Status: She is alert and oriented to person, place, and time.      Gait: Gait is intact.   Psychiatric:         Mood and Affect: Affect normal.        Imagin2022 mammogram: Normal    Labs  Recent labs from 10/11/2021 reviewed with the patient.    Assessment and Plan:   The following treatment plan was discussed    Problem List Items Addressed This Visit     Mild intermittent asthma without complication     Chronic, unstable.  Recommended to continue to use her albuterol as needed.  Advised to use Singulair to help with some of her symptoms.  Offered to refill this medication, already has some at home.         Generalized anxiety disorder     Chronic, stable.  Recommended to continue her Celexa 40 mg daily.  Contact psychiatry to schedule appointment.         Mild episode of recurrent major depressive disorder (HCC)     Chronic, stable.  Advised to continue Wellbutrin and Celexa.  Follow-up with psychiatry.         Attention deficit hyperactivity disorder (ADHD), inattentive type, mild     Chronic, stable.  Controlled substance agreement form signed and in the chart, urine drug screen completed.  PDMP reviewed.  Refill appropriate.         Relevant Medications    amphetamine-dextroamphetamine (ADDERALL XR, 20MG,) 20 MG per XR capsule (Start on 2022)    amphetamine-dextroamphetamine (ADDERALL XR, 20MG,) 20 MG per XR capsule (Start on 2022)    amphetamine-dextroamphetamine (ADDERALL XR, 20MG,) 20 MG per XR capsule     Brachioradial pruritus     Chronic, unstable.  Referral to physical therapy placed with patient.         Relevant Orders    Referral to Physical Therapy           Followup: Return in about 3 months (around 8/10/2022) for med refill.      Please note that this dictation was created using voice recognition software. I have made every reasonable attempt to correct obvious errors, but I expect that there are errors of grammar and possibly content that I did not discover before finalizing the note.

## 2022-05-10 NOTE — ASSESSMENT & PLAN NOTE
Chronic, stable.  Controlled substance agreement form signed and in the chart, urine drug screen completed.  PDMP reviewed.  Refill appropriate.

## 2022-05-10 NOTE — ASSESSMENT & PLAN NOTE
Chronic, unstable.  Recommended to continue to use her albuterol as needed.  Advised to use Singulair to help with some of her symptoms.  Offered to refill this medication, already has some at home.

## 2022-05-10 NOTE — ASSESSMENT & PLAN NOTE
Chronic, stable.  Recommended to continue her Celexa 40 mg daily.  Contact psychiatry to schedule appointment.

## 2022-06-22 ENCOUNTER — APPOINTMENT (RX ONLY)
Dept: URBAN - METROPOLITAN AREA CLINIC 6 | Facility: CLINIC | Age: 57
Setting detail: DERMATOLOGY
End: 2022-06-22

## 2022-06-22 DIAGNOSIS — L29.89 OTHER PRURITUS: ICD-10-CM

## 2022-06-22 DIAGNOSIS — L29.8 OTHER PRURITUS: ICD-10-CM

## 2022-06-22 PROCEDURE — ? PRESCRIPTION

## 2022-06-22 PROCEDURE — ? COUNSELING

## 2022-06-22 PROCEDURE — ? PRESCRIPTION MEDICATION MANAGEMENT

## 2022-06-22 PROCEDURE — 99213 OFFICE O/P EST LOW 20 MIN: CPT

## 2022-06-22 RX ORDER — GABAPENTIN 300 MG/1
1 CAPSULE ORAL QD
Qty: 120 | Refills: 0 | Status: ERX | COMMUNITY
Start: 2022-06-22

## 2022-06-22 RX ADMIN — GABAPENTIN 1: 300 CAPSULE ORAL at 00:00

## 2022-06-22 ASSESSMENT — LOCATION SIMPLE DESCRIPTION DERM
LOCATION SIMPLE: RIGHT UPPER ARM
LOCATION SIMPLE: LEFT UPPER ARM

## 2022-06-22 ASSESSMENT — LOCATION ZONE DERM: LOCATION ZONE: ARM

## 2022-06-22 NOTE — PROCEDURE: PRESCRIPTION MEDICATION MANAGEMENT
Detail Level: Zone
Render In Strict Bullet Format?: No
Samples Given: Dermeleve
Continue Regimen: Triamcinolone PRN for more severe flares
Initiate Treatment: gabapentin 300mg QHS, titrate up as needed

## 2022-07-08 ENCOUNTER — PHYSICAL THERAPY (OUTPATIENT)
Dept: PHYSICAL THERAPY | Facility: MEDICAL CENTER | Age: 57
End: 2022-07-08
Attending: FAMILY MEDICINE
Payer: COMMERCIAL

## 2022-07-08 DIAGNOSIS — M77.8 TENDINITIS OF RIGHT SHOULDER: ICD-10-CM

## 2022-07-08 PROCEDURE — 97014 ELECTRIC STIMULATION THERAPY: CPT

## 2022-07-08 PROCEDURE — 97161 PT EVAL LOW COMPLEX 20 MIN: CPT

## 2022-07-08 ASSESSMENT — ENCOUNTER SYMPTOMS
QUALITY: ACHING
PAIN LOCATION: ANTERIOR SHOULDER PAIN
PAIN SCALE: 5
PAIN SCALE AT LOWEST: 2
PAIN TIMING: INTERMITTENT
PAIN SCALE AT HIGHEST: 8
QUALITY: SHOOTING

## 2022-07-08 NOTE — OP THERAPY EVALUATION
Outpatient Physical Therapy  INITIAL EVALUATION    [facility] Therapy  [address]  [place] [address]  Phone:  [phone]  Fax:  [phone]    Date of Evaluation: 2022    Patient: Merari Paulson  YOB: 1965  MRN: [id number]     Referring Provider: GEREMIAS Silver  [address]  [place] [address]   Referring Diagnosis Brachioradial pruritus [L29.9]     Time Calculation                 Chief Complaint: right shoulder pain        Date of onset of impairment: 2022    Subjective   History of Present Illness:     Date of onset:  2022    History of chief complaint:  Pt 10 minutes late for appointment.     Pt has had anterior right shoulder pain for a few months. She had a cortisone injection about one month ago and it has helped with the sharp pain.    Denies n/t. She can get a zinger in her arm if she moves quickly.     Pt has 14 month old Great Freddie: 125# and he pulls on the leash: it may have injured her shoulder.     Pain:     Current pain ratin    At best pain ratin    At worst pain ratin    Location:  Anterior shoulder pain    Quality:  Aching and shooting    Pain timing:  Intermittent    Aggravating factors:  Reaching overhead, sleeping on right side, combing hair, putting bra on.     Relieving factors:  Cortisone shot helped, heat, anti-inflammatories : aleve and tylenol help.    Progression:  Improving    Activity Tolerance:     Current activity tolerance / Recreational activities:  Dog had pneumonia, so she's nursing him back to health. She usually walks dog 30-45 minutes. Likes to garden but it hurts her shoulder.     Work:  : cleans, hanging clothes, chores around the house,  etc.     20 hours/week.     Social Support:     Lives in:  Trailer    Lives with:  Spouse    Hand Dominance:     Hand dominance:  Right    Diagnostic Tests:     X-ray: abnormal  (Multiple views of the right shoulder reviewed.  There is a small calcific )      Treatments:     Treatments to date:  Ointments sometimes.     Patient Goals:     Patient goals for therapy:  Use arm normally without pain.     Saw chiropractor a few times, no change in pain.   Multiple views of the right shoulder reviewed.  There is a small calcific   deposit over the anterior lateral aspect of the humeral head.  There is   well-preserved joint space.  There is mild to moderate AC joint arthritis.    There is no acute fracture or dislocation noted.    Past Medical History:   Diagnosis Date   • ADHD    • Anxiety    • Depression    • Hyperlipidemia      Past Surgical History:   Procedure Laterality Date   • DENTAL EXTRACTION(S)  2017       Precautions:       Objective   Observation and functional movement:  Pt has mild head forward posture with protracted scapula .     Range of motion and strength:    AROM right shoulder limited: flexion 92*, abduction 78*, HBB wrist toL4 (vs T12 on left), ER 71*.     MMT: abduction 3/5 and painful, IR mildly painful,  all others WFL'S.    Sensation and reflexes:     Sensation is intact.      Reflexes are normal and symmetrical.    Palpation and joint mobility:     Pt has TTP right biceps tendon and anterior shoulder tenderness.     Pt fairly guarded with PROM and joint mobility testing.     Special tests:      + crossover and speeds test.            Therapeutic Exercises (CPT 92680):     1. Pt started on: AROM exercises, see below      Therapeutic Exercise Summary: Access Code: [email or web address]  URL: [email or web address]  Date: 07/08/2022  Prepared by: Josefa Harrison    Exercises  •Shoulder Flexion Wall Walk - 1 x daily - 7 x weekly - 3 sets - 10 reps  •Seated Shoulder Flexion Towel Slide at Table Top - 1 x daily - 7 x weekly - 3 sets - 10 reps  •Seated Shoulder Abduction Towel Slide at Table Top - 1 x daily - 7 x weekly - 3 sets - 10 reps  •Circular Shoulder Pendulum with Table Support  - 1 x daily - 7 x weekly - 3 sets - 10 reps        Therapeutic Treatments and Modalities:     1. E Stim Unattended (CPT 93702), IFC and mhp to right shoulder     Time-based treatments/modalities:           Assessment, Response and Plan:   Impairments: impaired functional mobility, lacks appropriate home exercise program and pain with function    Assessment details:  Merari is a pleasant 56 year old female with 2 month hx of right shoulder pain with probable rotator cuff impingement/tendinitis. Pt had some improvement with steroid injection one month ago, but would benefit from skilled PT to address lack of mobility and functional limitations.   Prognosis: fair    Goals:   Short Term Goals:   1. Pt will be compliant with daily HEP.   2. Pt will show 20% improvement in ROM with overhead activities.  Short term goal time span:  2-4 weeks      Long Term Goals:    1. Pt will have full ROM in right shoulder.  2. Pt will be able to sleep on her right side.  3. Pt will tolerate overhead ADL's.   4. Functional assessment score will improve 50%.     Long term goal time span:  6-8 weeks    Plan:   Therapy options:  Physical therapy treatment to continue  Planned therapy interventions:  E Stim Unattended (CPT 73549), Neuromuscular Re-education (CPT 08148) and Therapeutic Exercise (CPT 92960)  Frequency:  2x week  Duration in weeks:  6  Discussed with:  Patient  Plan details:  Manual therapy, ther ex, neuro danna, postural education, modalities.       Functional Assessment Used  PT Functional Assessment Tool Used: UEFI  PT Functional Assessment Score: 40/80     Referring provider co-signature:  I have reviewed this plan of care and my co-signature certifies the need for services.    Certification Period: 07/08/2022 to  9/08/2022    Physician Signature: ________________________________ Date: ______________

## 2022-07-15 ENCOUNTER — PHYSICAL THERAPY (OUTPATIENT)
Dept: PHYSICAL THERAPY | Facility: MEDICAL CENTER | Age: 57
End: 2022-07-15
Attending: FAMILY MEDICINE
Payer: COMMERCIAL

## 2022-07-15 DIAGNOSIS — M77.8 TENDINITIS OF RIGHT SHOULDER: ICD-10-CM

## 2022-07-15 PROCEDURE — 97014 ELECTRIC STIMULATION THERAPY: CPT

## 2022-07-15 PROCEDURE — 97110 THERAPEUTIC EXERCISES: CPT

## 2022-07-15 PROCEDURE — 97140 MANUAL THERAPY 1/> REGIONS: CPT

## 2022-07-15 NOTE — OP THERAPY DAILY TREATMENT
Outpatient Physical Therapy  DAILY TREATMENT     [facility] Outpatient Physical Therapy  [address]  [place] [address]  Phone:  [phone]  Fax:  [phone]    Date: 07/15/2022    Patient: Merari Paulson  YOB: 1965  MRN: [id number]     Time Calculation    Start time: 0930  Stop time: 1030 Time Calculation (min): 60 minutes         Chief Complaint: Shoulder Injury    Visit #: 2    SUBJECTIVE:  Merari is a pleasant 56 year old female with 2 month hx of right shoulder pain with probable rotator cuff impingement/tendinitis. She is stresses today as she has to put down her older great zeb.     OBJECTIVE:  Current objective measures:    AROM right shoulder limited: flexion 92*, abduction 78*, HBB wrist toL4 (vs T12 on left), ER 71*.     MMT: abduction 3/5 and painful, IR mildly painful,  all others WFL'S.          Therapeutic Exercises (CPT 57888):     1. UBE , 4 min forward and backwards    2. L stretch at counter, For HEP     3. Banded shoulder , Shoulder Rows 2 x 10, Shoulder pullbacks 2 x 10       Therapeutic Exercise Summary: Exercises  •Shoulder Flexion Wall Walk - 1 x daily - 7 x weekly - 3 sets - 10 reps  •Seated Shoulder Flexion Towel Slide at Table Top - 1 x daily - 7 x weekly - 3 sets - 10 reps  •Seated Shoulder Abduction Towel Slide at Table Top - 1 x daily - 7 x weekly - 3 sets - 10 reps  •Circular Shoulder Pendulum with Table Support - 1 x daily - 7 x weekly - 3 sets - 10 reps    Therapeutic Treatments and Modalities:     1. Manual Therapy (CPT 54897), Seated IASTM to R UQ, Pin and stretch to upper and mid trap, Some sensitiity towards passive ROM     2. E Stim Unattended (CPT 70509), IFC and heat to R shoulder    Time-based treatments/modalities:    Physical Therapy Timed Treatment Charges  Manual therapy minutes (CPT 68493): 15 minutes  Therapeutic exercise minutes (CPT 74191): 20 minutes          ASSESSMENT:   Response to treatment: Really  need to keep shoulders and mid back moving. She has lots of sensitivity towards ER and IR rotation and end range flexion. Needs mid back and posture work. Scap squeezes, t spine rotation.     PLAN/RECOMMENDATIONS:   Plan for treatment: therapy treatment to continue next visit.  Planned interventions for next visit: continue with current treatment.

## 2022-07-22 ENCOUNTER — PHYSICAL THERAPY (OUTPATIENT)
Dept: PHYSICAL THERAPY | Facility: MEDICAL CENTER | Age: 57
End: 2022-07-22
Attending: FAMILY MEDICINE
Payer: COMMERCIAL

## 2022-07-22 DIAGNOSIS — M77.8 TENDINITIS OF RIGHT SHOULDER: ICD-10-CM

## 2022-07-22 PROCEDURE — 97140 MANUAL THERAPY 1/> REGIONS: CPT

## 2022-07-22 PROCEDURE — 97014 ELECTRIC STIMULATION THERAPY: CPT

## 2022-07-22 PROCEDURE — 97110 THERAPEUTIC EXERCISES: CPT

## 2022-07-22 NOTE — OP THERAPY DAILY TREATMENT
Outpatient Physical Therapy  DAILY TREATMENT     [facility] Outpatient Physical Therapy  [address]  [place] [address]  Phone:  [phone]  Fax:  [phone]    Date: 07/22/2022    Patient: Merari Paulson  YOB: 1965  MRN: [id number]     Time Calculation    Start time: 1030  Stop time: 1129 Time Calculation (min): 59 minutes         Chief Complaint: Shoulder Problem    Visit #: 3    SUBJECTIVE:  Pt reporting limited shoulder pain today 2/10 at deltoid region R; reports she is trying to sleep train to sleep on her L side as sleep is problematic due to R shoulder pain.    OBJECTIVE:  Current objective measures:   Shoulder ROM pre-treatment in sitting:   AROM right shoulder limited: flexion 94*, abduction 81*, HBB wrist to L3*  ER 65*. End range pain with movements   Visible UT dominant pattern with elevation    Hypomobile scapular mobility ruben into abd and upward rotation    TTP levator scap and rhombodies            Therapeutic Exercises (CPT 74067):     1. UBE , 4 min forward and backwards, cardiovascular warm up    2. L stretch at counter, For HEP     3. Banded shoulder , Shoulder Rows 2 x 10, NT, Shoulder pullbacks 2 x 10 , NT    5. Scap squeezes    6. T/s rotations in SL, 10x, limited on R with pain with incr reps      Therapeutic Exercise Summary: Exercises  •Shoulder Flexion Wall Walk - 1 x daily - 7 x weekly - 3 sets - 10 reps (revewed; VC's to retract and depress shoulder blade)  •Seated Shoulder Flexion Towel Slide at Table Top - 1 x daily - 7 x weekly - 3 sets - 10 reps  •Seated Shoulder Abduction Towel Slide at Table Top - 1 x daily - 7 x weekly - 3 sets - 10 reps  •Circular Shoulder Pendulum with Table Support - 1 x daily - 7 x weekly - 3 sets - 10 reps    Access Code: [email or web address]  URL: [email or web address]  Date: 07/22/2022  Prepared by: Aidan Alcocer    Exercises  Seated Thoracic Lumbar Extension with Pectoralis Stretch - 2 x  daily - 7 x weekly - 1 sets - 10 reps  Seated Thoracic Flexion and Rotation with Arms Crossed - 2 x daily - 7 x weekly - 1 sets - 10 reps  Pt performed these exercises with instruction and SPV.  Provided handout with these exercises for daily HEP.      Therapeutic Treatments and Modalities:     1. Manual Therapy (CPT 71188), see below     2. E Stim Unattended (CPT 87958), IFC and heat to R shoulder    Therapeutic Treatment and Modalities Summary: Prone CPA to CTJ -t/s T10 gr II and III     L scapular mobs all directions Grade III-IV with pt R SL (pillow b/w knees for comfort) and pillow barrier between pt and therapist followed by L subscap release 2x10. Slight discomfort noted by pt but sig gains in GH IR and ER post treatment.          Time-based treatments/modalities:    Physical Therapy Timed Treatment Charges  Manual therapy minutes (CPT 84224): 25 minutes  Therapeutic exercise minutes (CPT 39175): 19 minutes      ASSESSMENT:   Response to treatment:   Pt reporting good compliance to hep and completes throughout the day without increased irritation. Hypomobile scapular mobility ruben into abd and upward rotation with TTP levator scap and rhomboids. Inability to tolerate t/s rotations in SL due to R shoulder pain; good tolerance in sitting with t/s ext and rotation. No change in ROM following re-testing end of session. Will benefit from further mobility and strengthening at t/s.      PLAN/RECOMMENDATIONS:   Plan for treatment: therapy treatment to continue next visit.  Planned interventions for next visit: continue with current treatment.  Provide hep with t/s ex

## 2022-07-29 ENCOUNTER — PHYSICAL THERAPY (OUTPATIENT)
Dept: PHYSICAL THERAPY | Facility: MEDICAL CENTER | Age: 57
End: 2022-07-29
Attending: FAMILY MEDICINE
Payer: COMMERCIAL

## 2022-07-29 DIAGNOSIS — M77.8 TENDINITIS OF RIGHT SHOULDER: ICD-10-CM

## 2022-07-29 PROCEDURE — 97014 ELECTRIC STIMULATION THERAPY: CPT

## 2022-07-29 PROCEDURE — 97140 MANUAL THERAPY 1/> REGIONS: CPT

## 2022-07-29 PROCEDURE — 97110 THERAPEUTIC EXERCISES: CPT

## 2022-07-29 NOTE — OP THERAPY DAILY TREATMENT
Outpatient Physical Therapy  DAILY TREATMENT     [facility] Outpatient Physical Therapy  [address]  [place] [address]  Phone:  [phone]  Fax:  [phone]    Date: 07/29/2022    Patient: Merari Paulson  YOB: 1965  MRN: [id number]     Time Calculation    Start time: 1015  Stop time: 1100 Time Calculation (min): 45 minutes         Chief Complaint: Shoulder Injury and Tendonitis    Visit #: 4    SUBJECTIVE:  Pt reports for follow up for some mild but persistent deltoid region R;   Morning pain is the worst. Likely due to poor ER of the R shoulder      OBJECTIVE:  Current objective measures:           Therapeutic Exercises (CPT 49344):     1. UBE, 4 min    2. L stretch at counter    3. Wall desi , Test/retest after exercise, hard to keep neutral spine/not much change post exercise and manual     4. PVC passthrough, x 10     5. PVC lat stretch     6. Banded UE ER, Cues for arms tight to body, handout given for HEP       Therapeutic Exercise Summary: Therapeutic Exercise Summary: Exercises  •Shoulder Flexion Wall Walk - 1 x daily - 7 x weekly - 3 sets - 10 reps (revewed; VC's to retract and depress shoulder blade)  •Seated Shoulder Flexion Towel Slide at Table Top - 1 x daily - 7 x weekly - 3 sets - 10 reps  •Seated Shoulder Abduction Towel Slide at Table Top - 1 x daily - 7 x weekly - 3 sets - 10 reps  •Circular Shoulder Pendulum with Table Support - 1 x daily - 7 x weekly - 3 sets - 10 reps    Access Code: [email or web address]  URL: [email or web address]  Date: 07/22/2022  Prepared by: Aidan Alcocer    Exercises  Seated Thoracic Lumbar Extension with Pectoralis Stretch - 2 x daily - 7 x weekly - 1 sets - 10 reps  Seated Thoracic Flexion and Rotation with Arms Crossed - 2 x daily - 7 x weekly - 1 sets - 10 reps  Pt performed these exercises with instruction and SPV.  Provided handout with these exercises for daily HEP.      Therapeutic Treatments and  Modalities:     1. Manual Therapy (CPT 35581), T spine and R shoulder, Seated T spien ext with over pressure/Supine shoulder mobs. education regarding scap control     2. E Stim Unattended (CPT 98695), IFC and heat to R shoulder x 15 min     Time-based treatments/modalities:    Physical Therapy Timed Treatment Charges  Manual therapy minutes (CPT 62286): 15 minutes  Therapeutic exercise minutes (CPT 60570): 30 minutes      ASSESSMENT:   Response to treatment: She is doing better. She has some poor scapular control and some stiffness in T spine. She warrants continued therapy services to address these issues. She understands transition th new therapist.     PLAN/RECOMMENDATIONS:   Plan for treatment: therapy treatment to continue next visit.  Planned interventions for next visit: continue with current treatment.

## 2022-08-05 ENCOUNTER — APPOINTMENT (OUTPATIENT)
Dept: PHYSICAL THERAPY | Facility: MEDICAL CENTER | Age: 57
End: 2022-08-05
Attending: FAMILY MEDICINE
Payer: COMMERCIAL

## 2022-08-08 NOTE — OP THERAPY DAILY TREATMENT
"  Outpatient Physical Therapy  DAILY TREATMENT     [facility] Outpatient Physical Therapy  [address]  [place] [address]  Phone:  [phone]  Fax:  [phone]    Date: 08/09/2022    Patient: Merari Paulson  YOB: 1965  MRN: [id number]     Time Calculation    Start time: 1019  Stop time: 1100 Time Calculation (min): 41 minutes         Chief Complaint: Shoulder Problem    Visit #: 5    SUBJECTIVE:  I'm hoping to get this shoulder strong enough to go salmon [employer] in the next couple weeks. I feel about 60-70% better. Still waking up with pain at times, painful at end range elevation. Doesn't feel strong in end range and still sensitive to overuse.  Having to space out the HEP throughout the day to improve tolerance.     OBJECTIVE:      Therapeutic Exercises (CPT 72417):     1. UBE, 6 min    2. Prone shoulder extension, x 15    3. Prone \"T\", x 15    4. Prone shoulder flexion , x 15     5. TB B shoulder ER, pink x 12    6. TB tricep press, Blue tubing 2x10    7. Shoulder scaption , Bilat with Blue tubing x 10    8. Foam roll, diaphragm breath with R UE supported 2\" on towel. Supine protraction/retraction x 15      Therapeutic Exercise Summary: HEP: bilateral shoulder ER pink TB,     Therapeutic Treatments and Modalities:     1. Manual Therapy (CPT 61260), Seated t/s ext with UEs crossed in front over forehead. T/S rotation with alt iso holds at end range. Supine: GHJ inf/post/distraction mobilization GIII/IV. 90/90 end range iso holds with manual resistance 5\"/5\" x 10    2. E Stim Unattended (CPT 84938), insufficient time today.     Time-based treatments/modalities:    Physical Therapy Timed Treatment Charges  Manual therapy minutes (CPT 01992): 11 minutes  Therapeutic exercise minutes (CPT 17749): 30 minutes    ASSESSMENT:   Response to treatment: Steady improvement in functional range, but poor scapular control with elevation above  deg. Will need " continued manual therapy to restore full PROM, therex/NM progressions to restore active control. Cautioned against repetitive tasks over shoulder height.     PLAN/RECOMMENDATIONS:   Plan for treatment: therapy treatment to continue next visit.  Planned interventions for next visit: continue with current treatment.

## 2022-08-09 ENCOUNTER — PHYSICAL THERAPY (OUTPATIENT)
Dept: PHYSICAL THERAPY | Facility: MEDICAL CENTER | Age: 57
End: 2022-08-09
Attending: FAMILY MEDICINE
Payer: COMMERCIAL

## 2022-08-09 DIAGNOSIS — M77.8 TENDINITIS OF RIGHT SHOULDER: ICD-10-CM

## 2022-08-09 PROCEDURE — 97110 THERAPEUTIC EXERCISES: CPT

## 2022-08-09 PROCEDURE — 97140 MANUAL THERAPY 1/> REGIONS: CPT

## 2022-08-12 ENCOUNTER — APPOINTMENT (OUTPATIENT)
Dept: PHYSICAL THERAPY | Facility: MEDICAL CENTER | Age: 57
End: 2022-08-12
Attending: FAMILY MEDICINE
Payer: COMMERCIAL

## 2022-08-16 ENCOUNTER — PHYSICAL THERAPY (OUTPATIENT)
Dept: PHYSICAL THERAPY | Facility: MEDICAL CENTER | Age: 57
End: 2022-08-16
Attending: FAMILY MEDICINE
Payer: COMMERCIAL

## 2022-08-16 DIAGNOSIS — M77.8 TENDINITIS OF RIGHT SHOULDER: ICD-10-CM

## 2022-08-16 PROCEDURE — 97110 THERAPEUTIC EXERCISES: CPT

## 2022-08-16 PROCEDURE — 97014 ELECTRIC STIMULATION THERAPY: CPT

## 2022-08-16 NOTE — OP THERAPY DAILY TREATMENT
Outpatient Physical Therapy  DAILY TREATMENT     [facility] Outpatient Physical Therapy  [address]  [place] [address]  Phone:  [phone]  Fax:  [phone]    Date: 08/16/2022    Patient: Merari Paulson  YOB: 1965  MRN: [id number]     Time Calculation    Start time: 1018  Stop time: 1120 Time Calculation (min): 62 minutes         Chief Complaint: Shoulder Problem    Visit #: 6    SUBJECTIVE:  I'm doing ok. I've been sleeping with a towel under my arm, which has been helping a lot. Sore more between the shoulder blades vs the shoulder. The pain is no longer consistent, more every now and then when I move in a strange way.     OBJECTIVE:      Therapeutic Exercises (CPT 77166):     1. UBE, 6 min    2. foam roller, diagphram breathing in pec minor stretch, protraction/retraction, alt GHJ flex, star gazer rocks, snow angels to 40 deg, x 8 min total    3. pulleys, x 2 min in scaption. x10 reps with eccentric lower    4. rows, 12# x 20, crossover bands    5. pullbacks, 12# 2x10    6. Shoulder ER, 7# x 13    7. Shoulder flexion with ER force, 7# 2x7    8. mirror GHJ flexion with focus on scap setting, x 3 min      Therapeutic Exercise Summary: HEP: bilateral shoulder ER pink TB,     Therapeutic Treatments and Modalities:     2. E Stim Unattended (CPT 06543), IFC and MH to the R shoulder x 15 min  Time-based treatments/modalities:    Physical Therapy Timed Treatment Charges  Therapeutic exercise minutes (CPT 11235): 45 minutes    ASSESSMENT:   Response to treatment: Improved anterior GHJ mobility allowing improved ER and abd positioning. Still with substantial scapular hike over 90 deg elevation, but improves with cuing and is able to control eccentrically from overhead position. AROM flex= 120 deg.     PLAN/RECOMMENDATIONS:   Plan for treatment: therapy treatment to continue next visit.  Planned interventions for next visit: continue with current treatment.  Pt will be OOT for a couple weeks, but will return for therex progressions to ensure full return of ROM and strength.

## 2022-08-19 ENCOUNTER — APPOINTMENT (OUTPATIENT)
Dept: PHYSICAL THERAPY | Facility: MEDICAL CENTER | Age: 57
End: 2022-08-19
Attending: FAMILY MEDICINE
Payer: COMMERCIAL

## 2022-08-26 ENCOUNTER — APPOINTMENT (OUTPATIENT)
Dept: PHYSICAL THERAPY | Facility: MEDICAL CENTER | Age: 57
End: 2022-08-26
Attending: FAMILY MEDICINE
Payer: COMMERCIAL

## 2022-09-09 ENCOUNTER — HOSPITAL ENCOUNTER (OUTPATIENT)
Dept: LAB | Facility: MEDICAL CENTER | Age: 57
End: 2022-09-09
Attending: FAMILY MEDICINE
Payer: COMMERCIAL

## 2022-09-09 DIAGNOSIS — E78.2 MIXED HYPERLIPIDEMIA: ICD-10-CM

## 2022-09-09 DIAGNOSIS — Z11.59 ENCOUNTER FOR HEPATITIS C SCREENING TEST FOR LOW RISK PATIENT: ICD-10-CM

## 2022-09-09 DIAGNOSIS — R53.83 FATIGUE, UNSPECIFIED TYPE: ICD-10-CM

## 2022-09-09 LAB
25(OH)D3 SERPL-MCNC: 49 NG/ML (ref 30–100)
ALBUMIN SERPL BCP-MCNC: 4.5 G/DL (ref 3.2–4.9)
ALBUMIN/GLOB SERPL: 1.9 G/DL
ALP SERPL-CCNC: 89 U/L (ref 30–99)
ALT SERPL-CCNC: 24 U/L (ref 2–50)
ANION GAP SERPL CALC-SCNC: 9 MMOL/L (ref 7–16)
AST SERPL-CCNC: 22 U/L (ref 12–45)
BASOPHILS # BLD AUTO: 0.6 % (ref 0–1.8)
BASOPHILS # BLD: 0.04 K/UL (ref 0–0.12)
BILIRUB SERPL-MCNC: 0.4 MG/DL (ref 0.1–1.5)
BUN SERPL-MCNC: 20 MG/DL (ref 8–22)
CALCIUM SERPL-MCNC: 9.3 MG/DL (ref 8.4–10.2)
CHLORIDE SERPL-SCNC: 108 MMOL/L (ref 96–112)
CHOLEST SERPL-MCNC: 305 MG/DL (ref 100–199)
CO2 SERPL-SCNC: 23 MMOL/L (ref 20–33)
CREAT SERPL-MCNC: 0.66 MG/DL (ref 0.5–1.4)
EOSINOPHIL # BLD AUTO: 0.38 K/UL (ref 0–0.51)
EOSINOPHIL NFR BLD: 5.7 % (ref 0–6.9)
ERYTHROCYTE [DISTWIDTH] IN BLOOD BY AUTOMATED COUNT: 45.7 FL (ref 35.9–50)
FASTING STATUS PATIENT QL REPORTED: NORMAL
GFR SERPLBLD CREATININE-BSD FMLA CKD-EPI: 101 ML/MIN/1.73 M 2
GLOBULIN SER CALC-MCNC: 2.4 G/DL (ref 1.9–3.5)
GLUCOSE SERPL-MCNC: 99 MG/DL (ref 65–99)
HCT VFR BLD AUTO: 36.7 % (ref 37–47)
HCV AB SER QL: NORMAL
HDLC SERPL-MCNC: 97 MG/DL
HGB BLD-MCNC: 12 G/DL (ref 12–16)
IMM GRANULOCYTES # BLD AUTO: 0.03 K/UL (ref 0–0.11)
IMM GRANULOCYTES NFR BLD AUTO: 0.4 % (ref 0–0.9)
LDLC SERPL CALC-MCNC: 192 MG/DL
LYMPHOCYTES # BLD AUTO: 1.53 K/UL (ref 1–4.8)
LYMPHOCYTES NFR BLD: 22.9 % (ref 22–41)
MCH RBC QN AUTO: 31.2 PG (ref 27–33)
MCHC RBC AUTO-ENTMCNC: 32.7 G/DL (ref 33.6–35)
MCV RBC AUTO: 95.3 FL (ref 81.4–97.8)
MONOCYTES # BLD AUTO: 0.57 K/UL (ref 0–0.85)
MONOCYTES NFR BLD AUTO: 8.5 % (ref 0–13.4)
NEUTROPHILS # BLD AUTO: 4.13 K/UL (ref 2–7.15)
NEUTROPHILS NFR BLD: 61.9 % (ref 44–72)
NRBC # BLD AUTO: 0 K/UL
NRBC BLD-RTO: 0 /100 WBC
PLATELET # BLD AUTO: 309 K/UL (ref 164–446)
PMV BLD AUTO: 9.5 FL (ref 9–12.9)
POTASSIUM SERPL-SCNC: 4.5 MMOL/L (ref 3.6–5.5)
PROT SERPL-MCNC: 6.9 G/DL (ref 6–8.2)
RBC # BLD AUTO: 3.85 M/UL (ref 4.2–5.4)
SODIUM SERPL-SCNC: 140 MMOL/L (ref 135–145)
TRIGL SERPL-MCNC: 81 MG/DL (ref 0–149)
TSH SERPL DL<=0.005 MIU/L-ACNC: 1.03 UIU/ML (ref 0.38–5.33)
WBC # BLD AUTO: 6.7 K/UL (ref 4.8–10.8)

## 2022-09-09 PROCEDURE — 80053 COMPREHEN METABOLIC PANEL: CPT

## 2022-09-09 PROCEDURE — 80061 LIPID PANEL: CPT

## 2022-09-09 PROCEDURE — 82306 VITAMIN D 25 HYDROXY: CPT

## 2022-09-09 PROCEDURE — 84443 ASSAY THYROID STIM HORMONE: CPT

## 2022-09-09 PROCEDURE — 86803 HEPATITIS C AB TEST: CPT

## 2022-09-09 PROCEDURE — 85025 COMPLETE CBC W/AUTO DIFF WBC: CPT

## 2022-09-09 PROCEDURE — 36415 COLL VENOUS BLD VENIPUNCTURE: CPT

## 2022-09-12 ENCOUNTER — OFFICE VISIT (OUTPATIENT)
Dept: MEDICAL GROUP | Facility: MEDICAL CENTER | Age: 57
End: 2022-09-12
Payer: COMMERCIAL

## 2022-09-12 VITALS
BODY MASS INDEX: 27.69 KG/M2 | HEART RATE: 74 BPM | WEIGHT: 162.2 LBS | HEIGHT: 64 IN | SYSTOLIC BLOOD PRESSURE: 134 MMHG | OXYGEN SATURATION: 99 % | DIASTOLIC BLOOD PRESSURE: 74 MMHG | TEMPERATURE: 97.5 F

## 2022-09-12 DIAGNOSIS — E78.2 MIXED HYPERLIPIDEMIA: ICD-10-CM

## 2022-09-12 DIAGNOSIS — Z86.19 HISTORY OF HEPATITIS B: ICD-10-CM

## 2022-09-12 DIAGNOSIS — F90.0 ATTENTION DEFICIT HYPERACTIVITY DISORDER (ADHD), INATTENTIVE TYPE, MILD: ICD-10-CM

## 2022-09-12 DIAGNOSIS — Z23 IMMUNIZATION DUE: ICD-10-CM

## 2022-09-12 DIAGNOSIS — D64.9 ANEMIA, UNSPECIFIED TYPE: ICD-10-CM

## 2022-09-12 PROCEDURE — 90471 IMMUNIZATION ADMIN: CPT | Performed by: FAMILY MEDICINE

## 2022-09-12 PROCEDURE — 99214 OFFICE O/P EST MOD 30 MIN: CPT | Mod: 25 | Performed by: FAMILY MEDICINE

## 2022-09-12 PROCEDURE — 90677 PCV20 VACCINE IM: CPT | Performed by: FAMILY MEDICINE

## 2022-09-12 RX ORDER — GABAPENTIN 300 MG/1
CAPSULE ORAL
COMMUNITY
Start: 2022-06-22

## 2022-09-12 RX ORDER — DEXTROAMPHETAMINE SACCHARATE, AMPHETAMINE ASPARTATE MONOHYDRATE, DEXTROAMPHETAMINE SULFATE AND AMPHETAMINE SULFATE 5; 5; 5; 5 MG/1; MG/1; MG/1; MG/1
20 CAPSULE, EXTENDED RELEASE ORAL EVERY MORNING
Qty: 30 CAPSULE | Refills: 0 | Status: SHIPPED | OUTPATIENT
Start: 2022-09-12 | End: 2022-10-12

## 2022-09-12 RX ORDER — ROSUVASTATIN CALCIUM 5 MG/1
5 TABLET, COATED ORAL EVERY EVENING
Qty: 30 TABLET | Refills: 11 | Status: SHIPPED | OUTPATIENT
Start: 2022-09-12

## 2022-09-12 RX ORDER — DEXTROAMPHETAMINE SACCHARATE, AMPHETAMINE ASPARTATE MONOHYDRATE, DEXTROAMPHETAMINE SULFATE AND AMPHETAMINE SULFATE 5; 5; 5; 5 MG/1; MG/1; MG/1; MG/1
20 CAPSULE, EXTENDED RELEASE ORAL EVERY MORNING
Qty: 30 CAPSULE | Refills: 0 | Status: SHIPPED
Start: 2022-10-13 | End: 2022-11-11

## 2022-09-12 RX ORDER — DEXTROAMPHETAMINE SACCHARATE, AMPHETAMINE ASPARTATE MONOHYDRATE, DEXTROAMPHETAMINE SULFATE AND AMPHETAMINE SULFATE 5; 5; 5; 5 MG/1; MG/1; MG/1; MG/1
20 CAPSULE, EXTENDED RELEASE ORAL EVERY MORNING
Qty: 30 CAPSULE | Refills: 0 | Status: SHIPPED
Start: 2022-11-13 | End: 2022-11-11

## 2022-09-12 ASSESSMENT — FIBROSIS 4 INDEX: FIB4 SCORE: 0.84

## 2022-09-12 NOTE — PROGRESS NOTES
Merari Paulson is a pleasant 56 y.o. female here for   Chief Complaint   Patient presents with    Lab Results      HPI:    Problem   Anemia    Unsure of this worsens for her anemia.  Currently taking a vitamin B complex daily.  Attempting to make significant changes to her diet.     Latest Reference Range & Units 9/9/22 10:25   WBC 4.8 - 10.8 K/uL 6.7   RBC 4.20 - 5.40 M/uL 3.85 (L)   Hemoglobin 12.0 - 16.0 g/dL 12.0   Hematocrit 37.0 - 47.0 % 36.7 (L)   MCV 81.4 - 97.8 fL 95.3   MCH 27.0 - 33.0 pg 31.2   MCHC 33.6 - 35.0 g/dL 32.7 (L)   RDW 35.9 - 50.0 fL 45.7   Platelet Count 164 - 446 K/uL 309   MPV 9.0 - 12.9 fL 9.5   (L): Data is abnormally low     History of Hepatitis B    Positive hepatitis B infection when she was an adolescent.  Does not always tell providers this due to stigma of being a drug user.  Has never had her hepatitis b recheck to ensure that this is not a chronic infection.  Does note a history of elevation to her liver enzymes, but felt that this was more related to being on Lipitor.     Attention Deficit Hyperactivity Disorder (Adhd), Inattentive Type, Mild    Adderall 20 mg XL every a.m. help with her ADHD symptoms.  Here today for a refill of this medication.     Mixed Hyperlipidemia    Stop taking her Zetia 10 mg daily and her rosuvastatin 10 mg daily   Latest Reference Range & Units 9/9/22 10:25   Cholesterol,Tot 100 - 199 mg/dL 305 (H)   Triglycerides 0 - 149 mg/dL 81   HDL >=40 mg/dL 97   LDL <100 mg/dL 192 (H)   (H): Data is abnormally high    Because of her elevations to her cholesterol, she would like to be initiated back on these medications.  Note that Lipitor caused increase in liver enzymes.            Current Medicines (including changes today)  Current Outpatient Medications   Medication Sig Dispense Refill    [START ON 11/13/2022] amphetamine-dextroamphetamine (ADDERALL XR, 20MG,) 20 MG per XR capsule Take 1 Capsule by mouth every morning for 30 days. 30 Capsule 0    [START  "ON 10/13/2022] amphetamine-dextroamphetamine (ADDERALL XR, 20MG,) 20 MG per XR capsule Take 1 Capsule by mouth every morning for 30 days. 30 Capsule 0    amphetamine-dextroamphetamine (ADDERALL XR, 20MG,) 20 MG per XR capsule Take 1 Capsule by mouth every morning for 30 days. 30 Capsule 0    rosuvastatin (CRESTOR) 5 MG Tab Take 1 Tablet by mouth every evening. 30 Tablet 11    gabapentin (NEURONTIN) 300 MG Cap PLEASE SEE ATTACHED FOR DETAILED DIRECTIONS      buPROPion (WELLBUTRIN XL) 300 MG XL tablet Take 1 Tablet by mouth every morning. 90 Tablet 3    citalopram (CELEXA) 40 MG Tab Take 1 Tablet by mouth every day. 90 Tablet 3    MILK THISTLE PO Take  by mouth.      CALCIUM-MAGNESIUM-ZINC PO Take  by mouth.      Probiotic Product (PROBIOTIC PO) Take  by mouth.      triamcinolone acetonide (KENALOG) 0.1 % Cream       pantoprazole (PROTONIX) 40 MG Tablet Delayed Response Take 1 tablet by mouth as needed. 90 tablet 1    fluticasone (FLONASE) 50 MCG/ACT nasal spray Administer 1 Spray into affected nostril(S) every day. 16 g 1    albuterol 108 (90 Base) MCG/ACT Aero Soln inhalation aerosol        No current facility-administered medications for this visit.     Past Medical/ Surgical History  She  has a past medical history of ADHD, Anxiety, Depression, and Hyperlipidemia.  She  has a past surgical history that includes dental extraction(s) (2017).       Objective:     /74 (BP Location: Left arm, Patient Position: Sitting, BP Cuff Size: Adult)   Pulse 74   Temp 36.4 °C (97.5 °F) (Temporal)   Ht 1.626 m (5' 4\")   Wt 73.6 kg (162 lb 3.2 oz)   SpO2 99%  Body mass index is 27.84 kg/m².    Physical Exam  Constitutional:       General: She is not in acute distress.  HENT:      Head: Normocephalic and atraumatic.      Right Ear: External ear normal.      Left Ear: External ear normal.   Eyes:      General: Lids are normal.      Extraocular Movements: Extraocular movements intact.      Conjunctiva/sclera: Conjunctivae " normal.      Pupils: Pupils are equal, round, and reactive to light.   Neck:      Trachea: Trachea normal.   Cardiovascular:      Rate and Rhythm: Normal rate and regular rhythm.      Heart sounds: Normal heart sounds. No murmur heard.    No friction rub. No gallop.   Pulmonary:      Effort: Pulmonary effort is normal. No accessory muscle usage.      Breath sounds: Normal breath sounds. No wheezing or rales.   Abdominal:      General: Bowel sounds are normal.      Palpations: Abdomen is soft.      Tenderness: There is no abdominal tenderness.   Musculoskeletal:      Cervical back: Normal range of motion and neck supple.      Right lower leg: No edema.      Left lower leg: No edema.   Lymphadenopathy:      Cervical: No cervical adenopathy.   Skin:     General: Skin is warm and dry.      Findings: No rash.   Neurological:      General: No focal deficit present.      Mental Status: She is alert and oriented to person, place, and time.      GCS: GCS eye subscore is 4. GCS verbal subscore is 5. GCS motor subscore is 6.      Gait: Gait is intact.      Deep Tendon Reflexes:      Reflex Scores:       Brachioradialis reflexes are 2+ on the right side and 2+ on the left side.       Patellar reflexes are 2+ on the right side and 2+ on the left side.  Psychiatric:         Attention and Perception: Attention normal.         Mood and Affect: Affect normal.         Speech: Speech normal.        Imaging:  No recent imaging to review    Labs  Recent labs from 09/09/2022 reviewed with the patient.    Assessment and Plan:   The following treatment plan was discussed    Problem List Items Addressed This Visit       Mixed hyperlipidemia     Chronic, unstable.  Initiate rosuvastatin 5 mg every evening.  Check CMP in 3 months  Recheck lipid profile x6 months.  Will eventually get her back on the higher rosuvastatin and Zetia.         Relevant Medications    rosuvastatin (CRESTOR) 5 MG Tab    Other Relevant Orders    Comp Metabolic Panel     Attention deficit hyperactivity disorder (ADHD), inattentive type, mild     Chronic, stable.  PDMP reviewed.  Controlled substance agreement form signed by the patient in the chart.  Urine drug screen up-to-date.  Refill appropriate and sent to her preferred pharmacy.         Relevant Medications    amphetamine-dextroamphetamine (ADDERALL XR, 20MG,) 20 MG per XR capsule (Start on 11/13/2022)    amphetamine-dextroamphetamine (ADDERALL XR, 20MG,) 20 MG per XR capsule (Start on 10/13/2022)    amphetamine-dextroamphetamine (ADDERALL XR, 20MG,) 20 MG per XR capsule    Anemia     New diagnosis for the patient.  Recheck CBC, iron, folate, B12, ferritin in 3 months.  Recommended a healthy diet with lean proteins and lots of fruits and vegetables.         Relevant Orders    CBC WITH DIFFERENTIAL    FOLATE    FERRITIN    IRON/TOTAL IRON BIND    VITAMIN B12    History of hepatitis B     Chronic, stable.  Hepatitis B panel ordered         Relevant Orders    HEP B CORE AB TOTAL    HEP B CORE AB IGM    HEP B SURFACE ANTIGEN    HEP B SURFACE AB     Other Visit Diagnoses       Immunization due        Relevant Orders    Pneumococcal Conjugate Vaccine 20-Valent (19 yrs+)             Followup: Return in about 3 months (around 12/12/2022) for Follow-up for labs and med refills.    I have placed vaccination orders.  The MA is preforming vaccination orders under the direction of Dr. Berrios    Please note that this dictation was created using voice recognition software. I have made every reasonable attempt to correct obvious errors, but I expect that there are errors of grammar and possibly content that I did not discover before finalizing the note.

## 2022-09-12 NOTE — ASSESSMENT & PLAN NOTE
New diagnosis for the patient.  Recheck CBC, iron, folate, B12, ferritin in 3 months.  Recommended a healthy diet with lean proteins and lots of fruits and vegetables.

## 2022-09-12 NOTE — ASSESSMENT & PLAN NOTE
Chronic, stable.  PDMP reviewed.  Controlled substance agreement form signed by the patient in the chart.  Urine drug screen up-to-date.  Refill appropriate and sent to her preferred pharmacy.

## 2022-09-12 NOTE — ASSESSMENT & PLAN NOTE
Chronic, unstable.  Initiate rosuvastatin 5 mg every evening.  Check CMP in 3 months  Recheck lipid profile x6 months.  Will eventually get her back on the higher rosuvastatin and Zetia.

## 2022-09-14 ENCOUNTER — APPOINTMENT (RX ONLY)
Dept: URBAN - METROPOLITAN AREA CLINIC 6 | Facility: CLINIC | Age: 57
Setting detail: DERMATOLOGY
End: 2022-09-14

## 2022-09-14 DIAGNOSIS — L29.89 OTHER PRURITUS: ICD-10-CM | Status: IMPROVED

## 2022-09-14 DIAGNOSIS — L82.1 OTHER SEBORRHEIC KERATOSIS: ICD-10-CM

## 2022-09-14 DIAGNOSIS — Z71.89 OTHER SPECIFIED COUNSELING: ICD-10-CM

## 2022-09-14 DIAGNOSIS — D18.0 HEMANGIOMA: ICD-10-CM

## 2022-09-14 DIAGNOSIS — D22 MELANOCYTIC NEVI: ICD-10-CM

## 2022-09-14 DIAGNOSIS — L29.8 OTHER PRURITUS: ICD-10-CM | Status: IMPROVED

## 2022-09-14 DIAGNOSIS — L81.4 OTHER MELANIN HYPERPIGMENTATION: ICD-10-CM

## 2022-09-14 PROBLEM — D18.01 HEMANGIOMA OF SKIN AND SUBCUTANEOUS TISSUE: Status: ACTIVE | Noted: 2022-09-14

## 2022-09-14 PROBLEM — D22.5 MELANOCYTIC NEVI OF TRUNK: Status: ACTIVE | Noted: 2022-09-14

## 2022-09-14 PROCEDURE — 99213 OFFICE O/P EST LOW 20 MIN: CPT

## 2022-09-14 PROCEDURE — ? SUNSCREEN TREATMENT REGIMEN

## 2022-09-14 PROCEDURE — ? PRESCRIPTION MEDICATION MANAGEMENT

## 2022-09-14 PROCEDURE — ? COUNSELING

## 2022-09-14 ASSESSMENT — LOCATION ZONE DERM
LOCATION ZONE: HAND
LOCATION ZONE: ARM
LOCATION ZONE: FACE
LOCATION ZONE: TRUNK

## 2022-09-14 ASSESSMENT — LOCATION SIMPLE DESCRIPTION DERM
LOCATION SIMPLE: RIGHT UPPER ARM
LOCATION SIMPLE: UPPER BACK
LOCATION SIMPLE: ABDOMEN
LOCATION SIMPLE: CHEST
LOCATION SIMPLE: LEFT HAND
LOCATION SIMPLE: LEFT UPPER ARM
LOCATION SIMPLE: RIGHT HAND
LOCATION SIMPLE: LEFT FOREHEAD

## 2022-09-14 ASSESSMENT — LOCATION DETAILED DESCRIPTION DERM
LOCATION DETAILED: RIGHT RADIAL DORSAL HAND
LOCATION DETAILED: LEFT RADIAL DORSAL HAND
LOCATION DETAILED: SUPERIOR THORACIC SPINE
LOCATION DETAILED: MIDDLE STERNUM
LOCATION DETAILED: PERIUMBILICAL SKIN
LOCATION DETAILED: LEFT ANTERIOR PROXIMAL UPPER ARM
LOCATION DETAILED: EPIGASTRIC SKIN
LOCATION DETAILED: LEFT ULNAR DORSAL HAND
LOCATION DETAILED: RIGHT ANTERIOR DISTAL UPPER ARM
LOCATION DETAILED: LEFT INFERIOR FOREHEAD

## 2022-09-14 NOTE — PROCEDURE: COUNSELING
Detail Level: Generalized
Detail Level: Zone
Detail Level: Detailed
Patient Specific Counseling (Will Not Stick From Patient To Patient): Recommended following up with a primary Dr for further treatment of neck pain or with established chiropractor

## 2022-09-14 NOTE — PROCEDURE: PRESCRIPTION MEDICATION MANAGEMENT
Detail Level: Zone
Render In Strict Bullet Format?: No
Samples Given: Dermeleve
Continue Regimen: Triamcinolone PRN for severe flares, Gabapentin 300mg up to TID

## 2022-09-16 ENCOUNTER — PHYSICAL THERAPY (OUTPATIENT)
Dept: PHYSICAL THERAPY | Facility: MEDICAL CENTER | Age: 57
End: 2022-09-16
Attending: FAMILY MEDICINE
Payer: COMMERCIAL

## 2022-09-16 DIAGNOSIS — M77.8 TENDINITIS OF RIGHT SHOULDER: ICD-10-CM

## 2022-09-16 DIAGNOSIS — F41.1 GENERALIZED ANXIETY DISORDER: ICD-10-CM

## 2022-09-16 DIAGNOSIS — F33.0 MILD EPISODE OF RECURRENT MAJOR DEPRESSIVE DISORDER (HCC): ICD-10-CM

## 2022-09-16 PROCEDURE — 97140 MANUAL THERAPY 1/> REGIONS: CPT

## 2022-09-16 PROCEDURE — 97110 THERAPEUTIC EXERCISES: CPT

## 2022-09-16 NOTE — OP THERAPY DAILY TREATMENT
Outpatient Physical Therapy  DAILY TREATMENT     [facility] Outpatient Physical Therapy  [address]  [place] [address]  Phone:  [phone]  Fax:  [phone]    Date: 09/16/2022    Patient: Merari Paulson  YOB: 1965  MRN: [id number]     Time Calculation    Start time: 0942  Stop time: 1035 Time Calculation (min): 53 minutes         Chief Complaint: Shoulder Problem    Visit #: 7    SUBJECTIVE:  I did well while on vacation. Was able to reel in fish without pain. I feel like I am about 85% better overall with regard to pain. I can tell the ROM is still not normal.     OBJECTIVE:  Quickdash General Total Score: 29.55   R shoulder AROM: flex= 145, abd= 130, HBH= T1, HBB= T11.     Therapeutic Exercises (CPT 02053):     1. UBE, L3 x 6 min alt    2. foam roller, HEP    3. Supine dowel flexion- varied hand width, 3# watebar x 20    4. SL shoulder ER, 3# 2x10    5. prone hughstons, 0# x 15 ea      Therapeutic Exercise Summary: HEP: prone hughstons.     Therapeutic Treatments and Modalities:     1. Manual Therapy (CPT 56394), Supine: end range flexion and abduction with inf/post mobilizations as indicated G IV. SL Scapular mobilization and subscap/lat release.    Therapeutic Treatment and Modalities Summary: Brief reassessment as noted above.   Time-based treatments/modalities:    Physical Therapy Timed Treatment Charges  Manual therapy minutes (CPT 30681): 13 minutes  Therapeutic exercise minutes (CPT 17541): 40 minutes    ASSESSMENT:   Response to treatment: See PN.     PLAN/RECOMMENDATIONS:   Plan for treatment: therapy treatment to continue next visit.  Planned interventions for next visit: continue with current treatment.

## 2022-09-16 NOTE — OP THERAPY PROGRESS SUMMARY
Outpatient Physical Therapy  PROGRESS SUMMARY NOTE      [facility] Outpatient Physical Therapy  [address]  [place] [address]  Phone:  [phone]  Fax:  [phone]    Date of Visit: 09/16/2022    Patient: Merari Paulson  YOB: 1965  MRN: [id number]     Referring Provider: GEREMIAS Silver  [address]  [address]  [place] [address]   Referring Diagnosis Pruritus, unspecified [L29.9]     Visit Diagnoses     ICD-10-CM   1. Tendinitis of right shoulder  M77.8       Rehab Potential: excellent    Progress Report Period: 7/8/2022-9/16/2022    Functional Assessment Used  Quickdash General Total Score: 29.55       Objective Findings and Assessment:   Patient progression towards goals:   Ms. Paulson has been seen for 7 PT sessions treating her R shoulder pain consistent with impingement/rotator cuff tendonopathy.  She is demonstrating good participation and consistent progress with therapy. She has met the STGs set at eval and making progress toward the LTGs. Her AROM now measures flex= 145, abd= 130, HBH= T1, HBB= T11. Her shoulder ER is 5/5 when measured at the side, but 3+/5 at 90 deg abd. Resultant continued poor scapulohumeral rhythm with functional reach over 120 degs. Still demonstrates moderate scapular hiking functionally. Would benefit from continued skilled PT services to assist with regaining full R shoulder AROM/PROM and focus on regaining posterior cuff strength to support normal overhead reach for ADLs.     Goals:   Short Term Goals:     - Improve R shoulder abduction to 160 deg passively without pain  - Improve R shoulder ER strength to 4+/5 at 90 abd  Short term goal time span:  1-2 weeks      Long Term Goals:      - Improve QuickDASH at least 12% compared to today's value of 29%  - Pt demonstrates ability to press 3# overhead through full R shoulder ROM and no pain  - Pt reports awaking from R shoulder discomfort no more than  1x/night  - Indep with HEP    Plan:   Planned therapy interventions:  E Stim Unattended (CPT 35000), Functional Training, Self Care (CPT 04090), Hot or Cold Pack Therapy (CPT 23251), Manual Therapy (CPT 14664), Therapeutic Exercise (CPT 64168), Therapeutic Activities (CPT 49030) and Neuromuscular Re-education (CPT 13719)  Frequency:  1x week  Duration in weeks:  8    Referring provider co-signature:  I have reviewed this plan of care and my co-signature certifies the need for services.     Certification Period: 09/16/2022 to 11/11/22    Physician Signature: ________________________________ Date: ______________

## 2022-09-19 RX ORDER — CITALOPRAM 40 MG/1
40 TABLET ORAL DAILY
Qty: 90 TABLET | Refills: 3 | Status: SHIPPED | OUTPATIENT
Start: 2022-09-19

## 2022-10-14 ENCOUNTER — APPOINTMENT (OUTPATIENT)
Dept: PHYSICAL THERAPY | Facility: MEDICAL CENTER | Age: 57
End: 2022-10-14
Attending: FAMILY MEDICINE
Payer: COMMERCIAL

## 2022-10-14 ENCOUNTER — PATIENT MESSAGE (OUTPATIENT)
Dept: MEDICAL GROUP | Facility: MEDICAL CENTER | Age: 57
End: 2022-10-14
Payer: COMMERCIAL

## 2022-10-14 DIAGNOSIS — F90.0 ATTENTION DEFICIT HYPERACTIVITY DISORDER (ADHD), INATTENTIVE TYPE, MILD: ICD-10-CM

## 2022-10-21 ENCOUNTER — APPOINTMENT (OUTPATIENT)
Dept: PHYSICAL THERAPY | Facility: MEDICAL CENTER | Age: 57
End: 2022-10-21
Attending: FAMILY MEDICINE
Payer: COMMERCIAL

## 2022-10-28 ENCOUNTER — APPOINTMENT (OUTPATIENT)
Dept: PHYSICAL THERAPY | Facility: MEDICAL CENTER | Age: 57
End: 2022-10-28
Attending: FAMILY MEDICINE
Payer: COMMERCIAL

## 2022-11-04 ENCOUNTER — APPOINTMENT (OUTPATIENT)
Dept: PHYSICAL THERAPY | Facility: MEDICAL CENTER | Age: 57
End: 2022-11-04
Attending: FAMILY MEDICINE
Payer: COMMERCIAL

## 2022-11-11 RX ORDER — DEXTROAMPHETAMINE SACCHARATE, AMPHETAMINE ASPARTATE, DEXTROAMPHETAMINE SULFATE AND AMPHETAMINE SULFATE 5; 5; 5; 5 MG/1; MG/1; MG/1; MG/1
20 TABLET ORAL 2 TIMES DAILY
Qty: 60 EACH | Refills: 0 | Status: SHIPPED | OUTPATIENT
Start: 2022-11-11 | End: 2022-12-11

## 2022-11-12 NOTE — PROGRESS NOTES
Refill patient's Adderall sent to her preferred pharmacy.  Her pharmacy did not have the extended release.     reviewed